# Patient Record
Sex: FEMALE | Race: WHITE | NOT HISPANIC OR LATINO | URBAN - METROPOLITAN AREA
[De-identification: names, ages, dates, MRNs, and addresses within clinical notes are randomized per-mention and may not be internally consistent; named-entity substitution may affect disease eponyms.]

---

## 2017-01-21 ENCOUNTER — OUTPATIENT (OUTPATIENT)
Dept: OUTPATIENT SERVICES | Facility: HOSPITAL | Age: 57
LOS: 1 days | End: 2017-01-21
Payer: MEDICARE

## 2017-01-21 ENCOUNTER — APPOINTMENT (OUTPATIENT)
Dept: NUCLEAR MEDICINE | Facility: IMAGING CENTER | Age: 57
End: 2017-01-21

## 2017-01-21 DIAGNOSIS — Z00.8 ENCOUNTER FOR OTHER GENERAL EXAMINATION: ICD-10-CM

## 2017-01-21 PROCEDURE — 78072 PARATHYRD PLANAR W/SPECT&CT: CPT

## 2017-01-21 PROCEDURE — A9500: CPT

## 2017-01-21 PROCEDURE — A9512: CPT

## 2017-04-26 ENCOUNTER — APPOINTMENT (OUTPATIENT)
Dept: CARDIOLOGY | Facility: CLINIC | Age: 57
End: 2017-04-26

## 2017-05-03 ENCOUNTER — INPATIENT (INPATIENT)
Facility: HOSPITAL | Age: 57
LOS: 0 days | Discharge: HOME | End: 2017-05-04
Attending: HOSPITALIST | Admitting: INTERNAL MEDICINE
Payer: MEDICARE

## 2017-05-03 VITALS
RESPIRATION RATE: 18 BRPM | DIASTOLIC BLOOD PRESSURE: 127 MMHG | SYSTOLIC BLOOD PRESSURE: 144 MMHG | OXYGEN SATURATION: 97 % | HEART RATE: 174 BPM | TEMPERATURE: 98 F

## 2017-05-03 DIAGNOSIS — I47.1 SUPRAVENTRICULAR TACHYCARDIA: ICD-10-CM

## 2017-05-03 LAB
ALBUMIN SERPL ELPH-MCNC: 4.6 G/DL — SIGNIFICANT CHANGE UP (ref 3.3–5)
ALP SERPL-CCNC: 79 U/L — SIGNIFICANT CHANGE UP (ref 40–120)
ALT FLD-CCNC: 58 U/L — SIGNIFICANT CHANGE UP (ref 12–78)
ANION GAP SERPL CALC-SCNC: 8 MMOL/L — SIGNIFICANT CHANGE UP (ref 5–17)
ANION GAP SERPL CALC-SCNC: 8 MMOL/L — SIGNIFICANT CHANGE UP (ref 5–17)
APTT BLD: 33.1 SEC — SIGNIFICANT CHANGE UP (ref 27.5–37.4)
AST SERPL-CCNC: 30 U/L — SIGNIFICANT CHANGE UP (ref 15–37)
BASOPHILS # BLD AUTO: 0.1 K/UL — SIGNIFICANT CHANGE UP (ref 0–0.2)
BASOPHILS # BLD AUTO: 0.1 K/UL — SIGNIFICANT CHANGE UP (ref 0–0.2)
BASOPHILS NFR BLD AUTO: 0.9 % — SIGNIFICANT CHANGE UP (ref 0–2)
BASOPHILS NFR BLD AUTO: 1 % — SIGNIFICANT CHANGE UP (ref 0–2)
BILIRUB SERPL-MCNC: 0.4 MG/DL — SIGNIFICANT CHANGE UP (ref 0.2–1.2)
BLD GP AB SCN SERPL QL: SIGNIFICANT CHANGE UP
BUN SERPL-MCNC: 10 MG/DL — SIGNIFICANT CHANGE UP (ref 7–23)
BUN SERPL-MCNC: 9 MG/DL — SIGNIFICANT CHANGE UP (ref 7–23)
CALCIUM SERPL-MCNC: 8.8 MG/DL — SIGNIFICANT CHANGE UP (ref 8.5–10.1)
CALCIUM SERPL-MCNC: 9.5 MG/DL — SIGNIFICANT CHANGE UP (ref 8.5–10.1)
CHLORIDE SERPL-SCNC: 107 MMOL/L — SIGNIFICANT CHANGE UP (ref 96–108)
CHLORIDE SERPL-SCNC: 109 MMOL/L — HIGH (ref 96–108)
CK SERPL-CCNC: 207 U/L — HIGH (ref 26–192)
CO2 SERPL-SCNC: 24 MMOL/L — SIGNIFICANT CHANGE UP (ref 22–31)
CO2 SERPL-SCNC: 27 MMOL/L — SIGNIFICANT CHANGE UP (ref 22–31)
CREAT SERPL-MCNC: 0.68 MG/DL — SIGNIFICANT CHANGE UP (ref 0.5–1.3)
CREAT SERPL-MCNC: 0.8 MG/DL — SIGNIFICANT CHANGE UP (ref 0.5–1.3)
EOSINOPHIL # BLD AUTO: 0.3 K/UL — SIGNIFICANT CHANGE UP (ref 0–0.5)
EOSINOPHIL # BLD AUTO: 0.5 K/UL — SIGNIFICANT CHANGE UP (ref 0–0.5)
EOSINOPHIL NFR BLD AUTO: 3.7 % — SIGNIFICANT CHANGE UP (ref 0–6)
EOSINOPHIL NFR BLD AUTO: 4.5 % — SIGNIFICANT CHANGE UP (ref 0–6)
GLUCOSE SERPL-MCNC: 114 MG/DL — HIGH (ref 70–99)
GLUCOSE SERPL-MCNC: 200 MG/DL — HIGH (ref 70–99)
HCT VFR BLD CALC: 37 % — SIGNIFICANT CHANGE UP (ref 34.5–45)
HCT VFR BLD CALC: 44.5 % — SIGNIFICANT CHANGE UP (ref 34.5–45)
HGB BLD-MCNC: 12.9 G/DL — SIGNIFICANT CHANGE UP (ref 11.5–15.5)
HGB BLD-MCNC: 15.4 G/DL — SIGNIFICANT CHANGE UP (ref 11.5–15.5)
INR BLD: 0.97 RATIO — SIGNIFICANT CHANGE UP (ref 0.88–1.16)
LYMPHOCYTES # BLD AUTO: 2.2 K/UL — SIGNIFICANT CHANGE UP (ref 1–3.3)
LYMPHOCYTES # BLD AUTO: 29 % — SIGNIFICANT CHANGE UP (ref 13–44)
LYMPHOCYTES # BLD AUTO: 38.8 % — SIGNIFICANT CHANGE UP (ref 13–44)
LYMPHOCYTES # BLD AUTO: 4.1 K/UL — HIGH (ref 1–3.3)
MCHC RBC-ENTMCNC: 31.7 PG — SIGNIFICANT CHANGE UP (ref 27–34)
MCHC RBC-ENTMCNC: 31.8 PG — SIGNIFICANT CHANGE UP (ref 27–34)
MCHC RBC-ENTMCNC: 34.5 GM/DL — SIGNIFICANT CHANGE UP (ref 32–36)
MCHC RBC-ENTMCNC: 34.7 GM/DL — SIGNIFICANT CHANGE UP (ref 32–36)
MCV RBC AUTO: 91.5 FL — SIGNIFICANT CHANGE UP (ref 80–100)
MCV RBC AUTO: 92 FL — SIGNIFICANT CHANGE UP (ref 80–100)
MONOCYTES # BLD AUTO: 0.5 K/UL — SIGNIFICANT CHANGE UP (ref 0–0.9)
MONOCYTES # BLD AUTO: 0.7 K/UL — SIGNIFICANT CHANGE UP (ref 0–0.9)
MONOCYTES NFR BLD AUTO: 6.8 % — SIGNIFICANT CHANGE UP (ref 2–14)
MONOCYTES NFR BLD AUTO: 6.8 % — SIGNIFICANT CHANGE UP (ref 2–14)
NEUTROPHILS # BLD AUTO: 4.6 K/UL — SIGNIFICANT CHANGE UP (ref 1.8–7.4)
NEUTROPHILS # BLD AUTO: 5.1 K/UL — SIGNIFICANT CHANGE UP (ref 1.8–7.4)
NEUTROPHILS NFR BLD AUTO: 48.9 % — SIGNIFICANT CHANGE UP (ref 43–77)
NEUTROPHILS NFR BLD AUTO: 59.5 % — SIGNIFICANT CHANGE UP (ref 43–77)
PLATELET # BLD AUTO: 254 K/UL — SIGNIFICANT CHANGE UP (ref 150–400)
PLATELET # BLD AUTO: 302 K/UL — SIGNIFICANT CHANGE UP (ref 150–400)
POTASSIUM SERPL-MCNC: 3.7 MMOL/L — SIGNIFICANT CHANGE UP (ref 3.5–5.3)
POTASSIUM SERPL-MCNC: 3.7 MMOL/L — SIGNIFICANT CHANGE UP (ref 3.5–5.3)
POTASSIUM SERPL-SCNC: 3.7 MMOL/L — SIGNIFICANT CHANGE UP (ref 3.5–5.3)
POTASSIUM SERPL-SCNC: 3.7 MMOL/L — SIGNIFICANT CHANGE UP (ref 3.5–5.3)
PROT SERPL-MCNC: 8.3 GM/DL — SIGNIFICANT CHANGE UP (ref 6–8.3)
PROTHROM AB SERPL-ACNC: 10.5 SEC — SIGNIFICANT CHANGE UP (ref 9.8–12.7)
RBC # BLD: 4.05 M/UL — SIGNIFICANT CHANGE UP (ref 3.8–5.2)
RBC # BLD: 4.84 M/UL — SIGNIFICANT CHANGE UP (ref 3.8–5.2)
RBC # FLD: 11.7 % — SIGNIFICANT CHANGE UP (ref 10.3–14.5)
RBC # FLD: 11.8 % — SIGNIFICANT CHANGE UP (ref 10.3–14.5)
SODIUM SERPL-SCNC: 141 MMOL/L — SIGNIFICANT CHANGE UP (ref 135–145)
SODIUM SERPL-SCNC: 142 MMOL/L — SIGNIFICANT CHANGE UP (ref 135–145)
T3 SERPL-MCNC: 79 NG/DL — LOW (ref 80–200)
T4 FREE SERPL-MCNC: 0.87 NG/DL — SIGNIFICANT CHANGE UP (ref 0.76–1.46)
TROPONIN I SERPL-MCNC: 0.02 NG/ML — SIGNIFICANT CHANGE UP (ref 0.01–0.04)
TROPONIN I SERPL-MCNC: 0.04 NG/ML — SIGNIFICANT CHANGE UP (ref 0.01–0.04)
TROPONIN I SERPL-MCNC: <0.015 NG/ML — SIGNIFICANT CHANGE UP (ref 0.01–0.04)
TSH SERPL-MCNC: 3.27 UU/ML — SIGNIFICANT CHANGE UP (ref 0.36–3.74)
TSH SERPL-MCNC: 9.42 UIU/ML — HIGH (ref 0.36–3.74)
TYPE + AB SCN PNL BLD: SIGNIFICANT CHANGE UP
WBC # BLD: 10.5 K/UL — SIGNIFICANT CHANGE UP (ref 3.8–10.5)
WBC # BLD: 7.6 K/UL — SIGNIFICANT CHANGE UP (ref 3.8–10.5)
WBC # FLD AUTO: 10.5 K/UL — SIGNIFICANT CHANGE UP (ref 3.8–10.5)
WBC # FLD AUTO: 7.6 K/UL — SIGNIFICANT CHANGE UP (ref 3.8–10.5)

## 2017-05-03 PROCEDURE — 99223 1ST HOSP IP/OBS HIGH 75: CPT

## 2017-05-03 PROCEDURE — 93010 ELECTROCARDIOGRAM REPORT: CPT | Mod: 76

## 2017-05-03 PROCEDURE — 71010: CPT | Mod: 26

## 2017-05-03 PROCEDURE — 99291 CRITICAL CARE FIRST HOUR: CPT

## 2017-05-03 RX ORDER — PANTOPRAZOLE SODIUM 20 MG/1
40 TABLET, DELAYED RELEASE ORAL
Qty: 0 | Refills: 0 | Status: DISCONTINUED | OUTPATIENT
Start: 2017-05-03 | End: 2017-05-04

## 2017-05-03 RX ORDER — ADENOSINE 3 MG/ML
6 INJECTION INTRAVENOUS ONCE
Qty: 0 | Refills: 0 | Status: COMPLETED | OUTPATIENT
Start: 2017-05-03 | End: 2017-05-03

## 2017-05-03 RX ORDER — LEVOTHYROXINE SODIUM 125 MCG
1 TABLET ORAL
Qty: 0 | Refills: 0 | COMMUNITY

## 2017-05-03 RX ORDER — ASPIRIN/CALCIUM CARB/MAGNESIUM 324 MG
325 TABLET ORAL ONCE
Qty: 0 | Refills: 0 | Status: COMPLETED | OUTPATIENT
Start: 2017-05-03 | End: 2017-05-03

## 2017-05-03 RX ORDER — ALPRAZOLAM 0.25 MG
0.5 TABLET ORAL
Qty: 0 | Refills: 0 | Status: DISCONTINUED | OUTPATIENT
Start: 2017-05-03 | End: 2017-05-04

## 2017-05-03 RX ORDER — METOPROLOL TARTRATE 50 MG
25 TABLET ORAL
Qty: 0 | Refills: 0 | Status: DISCONTINUED | OUTPATIENT
Start: 2017-05-03 | End: 2017-05-04

## 2017-05-03 RX ORDER — ENOXAPARIN SODIUM 100 MG/ML
40 INJECTION SUBCUTANEOUS EVERY 24 HOURS
Qty: 0 | Refills: 0 | Status: DISCONTINUED | OUTPATIENT
Start: 2017-05-03 | End: 2017-05-04

## 2017-05-03 RX ORDER — ATORVASTATIN CALCIUM 80 MG/1
20 TABLET, FILM COATED ORAL AT BEDTIME
Qty: 0 | Refills: 0 | Status: DISCONTINUED | OUTPATIENT
Start: 2017-05-03 | End: 2017-05-04

## 2017-05-03 RX ORDER — LATANOPROST 0.05 MG/ML
1 SOLUTION/ DROPS OPHTHALMIC; TOPICAL AT BEDTIME
Qty: 0 | Refills: 0 | Status: DISCONTINUED | OUTPATIENT
Start: 2017-05-03 | End: 2017-05-04

## 2017-05-03 RX ORDER — LEVOTHYROXINE SODIUM 125 MCG
100 TABLET ORAL DAILY
Qty: 0 | Refills: 0 | Status: DISCONTINUED | OUTPATIENT
Start: 2017-05-03 | End: 2017-05-04

## 2017-05-03 RX ORDER — OMEGA-3 ACID ETHYL ESTERS 1 G
2 CAPSULE ORAL
Qty: 0 | Refills: 0 | Status: DISCONTINUED | OUTPATIENT
Start: 2017-05-03 | End: 2017-05-04

## 2017-05-03 RX ORDER — SODIUM CHLORIDE 9 MG/ML
1000 INJECTION INTRAMUSCULAR; INTRAVENOUS; SUBCUTANEOUS ONCE
Qty: 0 | Refills: 0 | Status: COMPLETED | OUTPATIENT
Start: 2017-05-03 | End: 2017-05-03

## 2017-05-03 RX ADMIN — Medication 1 TABLET(S): at 14:25

## 2017-05-03 RX ADMIN — Medication 0.5 MILLIGRAM(S): at 22:09

## 2017-05-03 RX ADMIN — Medication 100 MICROGRAM(S): at 15:54

## 2017-05-03 RX ADMIN — ATORVASTATIN CALCIUM 20 MILLIGRAM(S): 80 TABLET, FILM COATED ORAL at 22:09

## 2017-05-03 RX ADMIN — Medication 325 MILLIGRAM(S): at 05:50

## 2017-05-03 RX ADMIN — PANTOPRAZOLE SODIUM 40 MILLIGRAM(S): 20 TABLET, DELAYED RELEASE ORAL at 15:54

## 2017-05-03 RX ADMIN — Medication 25 MILLIGRAM(S): at 11:08

## 2017-05-03 RX ADMIN — ENOXAPARIN SODIUM 40 MILLIGRAM(S): 100 INJECTION SUBCUTANEOUS at 14:25

## 2017-05-03 RX ADMIN — Medication 25 MILLIGRAM(S): at 18:17

## 2017-05-03 RX ADMIN — ADENOSINE 6 MILLIGRAM(S): 3 INJECTION INTRAVENOUS at 04:45

## 2017-05-03 RX ADMIN — Medication 2 GRAM(S): at 18:44

## 2017-05-03 RX ADMIN — SODIUM CHLORIDE 1000 MILLILITER(S): 9 INJECTION INTRAMUSCULAR; INTRAVENOUS; SUBCUTANEOUS at 04:46

## 2017-05-03 RX ADMIN — LATANOPROST 1 DROP(S): 0.05 SOLUTION/ DROPS OPHTHALMIC; TOPICAL at 22:09

## 2017-05-03 NOTE — H&P ADULT - NSHPPHYSICALEXAM_GEN_ALL_CORE
Vital Signs Last 24 Hrs  T(C): 36.6, Max: 37.1 (05-03 @ 07:20)  T(F): 97.8, Max: 98.7 (05-03 @ 07:20)  HR: 84 (84 - 174)  BP: 122/87 (103/73 - 144/127)  BP(mean): 83 (83 - 83)  RR: 16 (12 - 18)  SpO2: 99% (97% - 100%)

## 2017-05-03 NOTE — CONSULT NOTE ADULT - ASSESSMENT
55 yo F with SVT    *SVT  -admit to tele  -cardiac monitoring  -cardiology cx dr. patel  -daily ekg  -monitor lytes and supplement prn  -metoprolol      *Depression/Anxiety  -mood stable, c/w home meds      *Hypothyroidism:  -c/w synthroid  -TSH noted  -check T3, Free T4  -recent thyroid US, normal (no changes) as per pt      *DVT px:  -LMWH

## 2017-05-03 NOTE — CONSULT NOTE ADULT - SUBJECTIVE AND OBJECTIVE BOX
PCP:    REQUESTING PHYSICIAN:    REASON FOR CONSULT:    CHIEF COMPLAINT:    HPI:  57 yo F with PMHx of Hypothyroidism, Anxiety, GERD p/w palpitations. pt woke up at 3 am with the need to void and felt like her heart was racing. checked her pulse at home, ~160-170 bpm. Pt waited about 1 hr and no improvement, came to ED for eval. Pt given adenosine in ED and converted to NSR. Currently comfortable in ED, does report intermittent palpitations. Pt saw her Endocrinologist yesterday and had an US of her thyroid, no changes from prior. her synthroid dose was also increased, pt did not yet start higher dose. Denies any tremor, weight change, visual changes, hair loss. She does report feeling hot often. (03 May 2017 10:43).  She has seen Dr. Jiménez in the past for an abnormal ECG which has remain unchanged for several years. She had a stress test several years ago which was non ischemic. She denies any exertional symptoms. She has never had an episode like this before.       PAST MEDICAL & SURGICAL HISTORY:  Hypothyroidism  Depression  Anxiety  GERD (gastroesophageal reflux disease)  HLD (hyperlipidemia)  No significant past surgical history      Allergies    codeine (Other)    Intolerances        SOCIAL HISTORY: ETOH 5/week, no tobacco    FAMILY HISTORY:  No pertinent family history in first degree relatives      MEDICATIONS:  MEDICATIONS  (STANDING):  atorvastatin 20milliGRAM(s) Oral at bedtime  omega-3-Acid Ethyl Esters 2Gram(s) Oral two times a day  latanoprost 0.005% Ophthalmic Solution 1Drop(s) Both EYES at bedtime  pantoprazole    Tablet 40milliGRAM(s) Oral before breakfast  levothyroxine 100MICROGram(s) Oral daily  multivitamin 1Tablet(s) Oral daily  enoxaparin Injectable 40milliGRAM(s) SubCutaneous every 24 hours  metoprolol 25milliGRAM(s) Oral two times a day    MEDICATIONS  (PRN):  ALPRAZolam 0.5milliGRAM(s) Oral two times a day PRN anxiety      REVIEW OF SYSTEMS:    CONSTITUTIONAL: No weakness, fevers or chills  EYES/ENT: No visual changes;  No vertigo or throat pain   NECK: No pain or stiffness  RESPIRATORY: No cough, wheezing, hemoptysis; No shortness of breath  CARDIOVASCULAR: No chest pain or palpitations  GASTROINTESTINAL: No abdominal or epigastric pain. No nausea, vomiting, or hematemesis; No diarrhea or constipation. No melena or hematochezia.  GENITOURINARY: No dysuria, frequency or hematuria  NEUROLOGICAL: No numbness or weakness  SKIN: No itching, burning, rashes, or lesions   All other review of systems is negative unless indicated above    Vital Signs Last 24 Hrs  T(C): 37.2, Max: 37.2 (05-03 @ 16:04)  T(F): 98.9, Max: 98.9 (05-03 @ 16:04)  HR: 67 (67 - 174)  BP: 129/77 (103/73 - 149/87)  BP(mean): 83 (83 - 83)  RR: 17 (12 - 18)  SpO2: 96% (96% - 100%)    I&O's Summary    I & Os for current day (as of 03 May 2017 16:32)  =============================================  IN: 1000 ml / OUT: 0 ml / NET: 1000 ml      PHYSICAL EXAM:    Constitutional: NAD, awake and alert, well-developed  HEENT: PERR, EOMI,  No oral cyananosis.  Neck:  supple,  No JVD  Respiratory: Breath sounds are clear bilaterally, No wheezing, rales or rhonchi  Cardiovascular: S1 and S2, regular rate and rhythm, no Murmurs, gallops or rubs  Gastrointestinal: Bowel Sounds present, soft, nontender.   Extremities: No peripheral edema. No clubbing or cyanosis.  Vascular: 2+ peripheral pulses  Neurological: A/O x 3, no focal deficits  Musculoskeletal: no calf tenderness.  Skin: No rashes.      LABS: All Labs Reviewed:                        12.9   7.6   )-----------( 254      ( 03 May 2017 12:01 )             37.0                         15.4   10.5  )-----------( 302      ( 03 May 2017 04:34 )             44.5     03 May 2017 12:01    141    |  109    |  9      ----------------------------<  114    3.7     |  24     |  0.68   03 May 2017 04:34    142    |  107    |  10     ----------------------------<  200    3.7     |  27     |  0.80     Ca    8.8        03 May 2017 12:01  Ca    9.5        03 May 2017 04:34    TPro  8.3    /  Alb  4.6    /  TBili  0.4    /  DBili  x      /  AST  30     /  ALT  58     /  AlkPhos  79     03 May 2017 04:34    PT/INR - ( 03 May 2017 04:34 )   PT: 10.5 sec;   INR: 0.97 ratio         PTT - ( 03 May 2017 04:34 )  PTT:33.1 sec  CARDIAC MARKERS ( 03 May 2017 12:01 )  0.036 ng/mL / x     / x     / x     / x      CARDIAC MARKERS ( 03 May 2017 04:34 )  <0.015 ng/mL / x     / 207 U/L / x     / x          Blood Culture:     05-03 @ 12:01  TSH: 3.270  05-03 @ 04:34  TSH: 9.420      RADIOLOGY/EKG:  PCP:    REQUESTING PHYSICIAN:    REASON FOR CONSULT:    CHIEF COMPLAINT:    HPI:  57 yo F with PMHx of Hypothyroidism, Anxiety, GERD p/w palpitations. pt woke up at 3 am with the need to void and felt like her heart was racing. checked her pulse at home, ~160-170 bpm. Pt waited about 1 hr and no improvement, came to ED for eval. Pt given adenosine in ED and converted to NSR. Currently comfortable in ED, does report intermittent palpitations. Pt saw her Endocrinologist yesterday and had an US of her thyroid, no changes from prior. her synthroid dose was also increased, pt did not yet start higher dose. Denies any tremor, weight change, visual changes, hair loss. She does report feeling hot often. (03 May 2017 10:43)      PAST MEDICAL & SURGICAL HISTORY:  Hypothyroidism  Depression  Anxiety  GERD (gastroesophageal reflux disease)  HLD (hyperlipidemia)  No significant past surgical history      Allergies    codeine (Other)    Intolerances        SOCIAL HISTORY:    FAMILY HISTORY:  No pertinent family history in first degree relatives      MEDICATIONS:  MEDICATIONS  (STANDING):  atorvastatin 20milliGRAM(s) Oral at bedtime  omega-3-Acid Ethyl Esters 2Gram(s) Oral two times a day  latanoprost 0.005% Ophthalmic Solution 1Drop(s) Both EYES at bedtime  pantoprazole    Tablet 40milliGRAM(s) Oral before breakfast  levothyroxine 100MICROGram(s) Oral daily  multivitamin 1Tablet(s) Oral daily  enoxaparin Injectable 40milliGRAM(s) SubCutaneous every 24 hours  metoprolol 25milliGRAM(s) Oral two times a day    MEDICATIONS  (PRN):  ALPRAZolam 0.5milliGRAM(s) Oral two times a day PRN anxiety      REVIEW OF SYSTEMS:    CONSTITUTIONAL: No weakness, fevers or chills  EYES/ENT: No visual changes;  No vertigo or throat pain   NECK: No pain or stiffness  RESPIRATORY: No cough, wheezing, hemoptysis; No shortness of breath  CARDIOVASCULAR: No chest pain or palpitations  GASTROINTESTINAL: No abdominal or epigastric pain. No nausea, vomiting, or hematemesis; No diarrhea or constipation. No melena or hematochezia.  GENITOURINARY: No dysuria, frequency or hematuria  NEUROLOGICAL: No numbness or weakness  SKIN: No itching, burning, rashes, or lesions   All other review of systems is negative unless indicated above    Vital Signs Last 24 Hrs  T(C): 37.2, Max: 37.2 (05-03 @ 16:04)  T(F): 98.9, Max: 98.9 (05-03 @ 16:04)  HR: 67 (67 - 174)  BP: 129/77 (103/73 - 149/87)  BP(mean): 83 (83 - 83)  RR: 17 (12 - 18)  SpO2: 96% (96% - 100%)    I&O's Summary    I & Os for current day (as of 03 May 2017 16:32)  =============================================  IN: 1000 ml / OUT: 0 ml / NET: 1000 ml      PHYSICAL EXAM:    Constitutional: NAD, awake and alert, well-developed  HEENT: PERR, EOMI,  No oral cyananosis.  Neck:  supple,  No JVD  Respiratory: Breath sounds are clear bilaterally, No wheezing, rales or rhonchi  Cardiovascular: S1 and S2, regular rate and rhythm, no Murmurs, gallops or rubs  Gastrointestinal: Bowel Sounds present, soft, nontender.   Extremities: No peripheral edema. No clubbing or cyanosis.  Vascular: 2+ peripheral pulses  Neurological: A/O x 3, no focal deficits  Musculoskeletal: no calf tenderness.  Skin: No rashes.      LABS: All Labs Reviewed:                        12.9   7.6   )-----------( 254      ( 03 May 2017 12:01 )             37.0                         15.4   10.5  )-----------( 302      ( 03 May 2017 04:34 )             44.5     03 May 2017 12:01    141    |  109    |  9      ----------------------------<  114    3.7     |  24     |  0.68   03 May 2017 04:34    142    |  107    |  10     ----------------------------<  200    3.7     |  27     |  0.80     Ca    8.8        03 May 2017 12:01  Ca    9.5        03 May 2017 04:34    TPro  8.3    /  Alb  4.6    /  TBili  0.4    /  DBili  x      /  AST  30     /  ALT  58     /  AlkPhos  79     03 May 2017 04:34    PT/INR - ( 03 May 2017 04:34 )   PT: 10.5 sec;   INR: 0.97 ratio         PTT - ( 03 May 2017 04:34 )  PTT:33.1 sec  CARDIAC MARKERS ( 03 May 2017 12:01 )  0.036 ng/mL / x     / x     / x     / x      CARDIAC MARKERS ( 03 May 2017 04:34 )  <0.015 ng/mL / x     / 207 U/L / x     / x          Blood Culture:     05-03 @ 12:01  TSH: 3.270  05-03 @ 04:34  TSH: 9.420      RADIOLOGY/EKG:    NSR anterior T wave inversions (described previously).

## 2017-05-03 NOTE — CONSULT NOTE ADULT - PROBLEM SELECTOR RECOMMENDATION 9
Pt has been in NSR since admission. She remains hemodynamically stable. Pt should have thyroid status evaluated and treated. Outpatient cardiac workup. Pt has been in NSR since admission. She remains hemodynamically stable. Pt should have thyroid status evaluated and treated. Outpatient cardiac workup. Continue beta blocker.

## 2017-05-03 NOTE — ED ADULT NURSE REASSESSMENT NOTE - NS ED NURSE REASSESS COMMENT FT1
Adenosine 6mg IVP administered by  with Rapid conversion of SVT to Sinus tachycardia. Pt tolerated well. HR at 100s. No complains noted at this time. Will continue to monitor closely.

## 2017-05-03 NOTE — ED PROVIDER NOTE - DETAILS:
I, Marylu Sandoval, performed the initial face to face bedside interview with this patient regarding history of present illness, review of symptoms and relevant past medical, social and family history.  I completed an independent physical examination.  I was the initial provider who evaluated this patient. The history, relevant review of systems, past medical and surgical history, medical decision making, and physical examination was documented by the scribe in my presence and I attest to the accuracy of the documentation.

## 2017-05-03 NOTE — H&P ADULT - ASSESSMENT
57 yo F with SVT    *SVT  -admit to tele  -cardiac monitoring  -cardiology cx dr. patel  -daily ekg  -monitor lytes and supplement prn  -metoprolol      *Depression/Anxiety  -mood stable, c/w home meds      *Hypothyroidism:  -c/w synthroid  -TSH noted  -check T3, Free T4  -recent thyroid US, normal (no changes) as per pt      *DVT px:  -LMWH

## 2017-05-03 NOTE — H&P ADULT - PMH
Anxiety    Depression    GERD (gastroesophageal reflux disease)    HLD (hyperlipidemia)    Hypothyroidism

## 2017-05-03 NOTE — ED ADULT NURSE NOTE - ED STAT RN HANDOFF DETAILS
Recvd care of pt at this time from [facility]AN[facility] June, pt resting comfortably, A & O x 4, with no complaints at this time.

## 2017-05-03 NOTE — H&P ADULT - NSHPLABSRESULTS_GEN_ALL_CORE
CARDIAC MARKERS ( 03 May 2017 04:34 )  <0.015 ng/mL / x     / 207 U/L / x     / x                                15.4   10.5  )-----------( 302      ( 03 May 2017 04:34 )             44.5     03 May 2017 04:34    142    |  107    |  10     ----------------------------<  200    3.7     |  27     |  0.80     Ca    9.5        03 May 2017 04:34    TPro  8.3    /  Alb  4.6    /  TBili  0.4    /  DBili  x      /  AST  30     /  ALT  58     /  AlkPhos  79     03 May 2017 04:34    PT/INR - ( 03 May 2017 04:34 )   PT: 10.5 sec;   INR: 0.97 ratio         PTT - ( 03 May 2017 04:34 )  PTT:33.1 sec  CAPILLARY BLOOD GLUCOSE    LIVER FUNCTIONS - ( 03 May 2017 04:34 )  Alb: 4.6 g/dL / Pro: 8.3 gm/dL / ALK PHOS: 79 U/L / ALT: 58 U/L / AST: 30 U/L / GGT: x

## 2017-05-03 NOTE — H&P ADULT - HISTORY OF PRESENT ILLNESS
57 yo F with PMHx of Hypothyroidism, Anxiety, GERD p/w palpitations. pt woke up at 3 am with the need to void and felt like her heart was racing. checked her pulse at home, ~160-170 bpm. Pt waited about 1 hr and no improvement, came to ED for eval. Pt given adenosine in ED and converted to NSR. Currently comfortable in ED, does report intermittent palpitations. Pt saw her Endocrinologist yesterday and had an US of her thyroid, no changes from prior. her synthroid dose was also increased, pt did not yet start higher dose. Denies any tremor, weight change, visual changes, hair loss. She does report feeling hot often.

## 2017-05-03 NOTE — ED PROVIDER NOTE - NS ED MD SCRIBE ATTENDING SCRIBE SECTIONS
REVIEW OF SYSTEMS/DISPOSITION/VITAL SIGNS( Pullset)/RESULTS/PAST MEDICAL/SURGICAL/SOCIAL HISTORY/PROGRESS NOTE/HISTORY OF PRESENT ILLNESS/PHYSICAL EXAM

## 2017-05-03 NOTE — ED ADULT NURSE NOTE - OBJECTIVE STATEMENT
Pt presented to ED c/o palpitation. As per pt, pt woke up this morning and felt her "heart racing" in conjunction with urinary urgency. No chest pain as per pt. Upon presentation to ED, pt in SVT with HR of 170s. No SOB, fever, abd pain, N/V/D. No other complains noted at this time.

## 2017-05-04 ENCOUNTER — TRANSCRIPTION ENCOUNTER (OUTPATIENT)
Age: 57
End: 2017-05-04

## 2017-05-04 VITALS
SYSTOLIC BLOOD PRESSURE: 134 MMHG | HEART RATE: 65 BPM | TEMPERATURE: 98 F | DIASTOLIC BLOOD PRESSURE: 87 MMHG | OXYGEN SATURATION: 97 % | RESPIRATION RATE: 17 BRPM

## 2017-05-04 DIAGNOSIS — R00.2 PALPITATIONS: ICD-10-CM

## 2017-05-04 LAB
ANION GAP SERPL CALC-SCNC: 7 MMOL/L — SIGNIFICANT CHANGE UP (ref 5–17)
BUN SERPL-MCNC: 15 MG/DL — SIGNIFICANT CHANGE UP (ref 7–23)
CALCIUM SERPL-MCNC: 9.4 MG/DL — SIGNIFICANT CHANGE UP (ref 8.5–10.1)
CHLORIDE SERPL-SCNC: 107 MMOL/L — SIGNIFICANT CHANGE UP (ref 96–108)
CO2 SERPL-SCNC: 28 MMOL/L — SIGNIFICANT CHANGE UP (ref 22–31)
CREAT SERPL-MCNC: 0.75 MG/DL — SIGNIFICANT CHANGE UP (ref 0.5–1.3)
GLUCOSE SERPL-MCNC: 129 MG/DL — HIGH (ref 70–99)
MAGNESIUM SERPL-MCNC: 2.2 MG/DL — SIGNIFICANT CHANGE UP (ref 1.8–2.4)
POTASSIUM SERPL-MCNC: 3.7 MMOL/L — SIGNIFICANT CHANGE UP (ref 3.5–5.3)
POTASSIUM SERPL-SCNC: 3.7 MMOL/L — SIGNIFICANT CHANGE UP (ref 3.5–5.3)
SODIUM SERPL-SCNC: 142 MMOL/L — SIGNIFICANT CHANGE UP (ref 135–145)

## 2017-05-04 PROCEDURE — 99232 SBSQ HOSP IP/OBS MODERATE 35: CPT

## 2017-05-04 RX ORDER — METOPROLOL TARTRATE 50 MG
50 TABLET ORAL
Qty: 0 | Refills: 0 | Status: DISCONTINUED | OUTPATIENT
Start: 2017-05-04 | End: 2017-05-04

## 2017-05-04 RX ORDER — METOPROLOL TARTRATE 50 MG
1 TABLET ORAL
Qty: 0 | Refills: 0 | COMMUNITY
Start: 2017-05-04

## 2017-05-04 RX ORDER — METOPROLOL TARTRATE 50 MG
1 TABLET ORAL
Qty: 60 | Refills: 0
Start: 2017-05-04 | End: 2017-06-03

## 2017-05-04 RX ADMIN — PANTOPRAZOLE SODIUM 40 MILLIGRAM(S): 20 TABLET, DELAYED RELEASE ORAL at 06:02

## 2017-05-04 RX ADMIN — Medication 100 MICROGRAM(S): at 06:02

## 2017-05-04 RX ADMIN — Medication 25 MILLIGRAM(S): at 06:02

## 2017-05-04 RX ADMIN — Medication 1 TABLET(S): at 10:44

## 2017-05-04 RX ADMIN — Medication 2 GRAM(S): at 06:02

## 2017-05-04 RX ADMIN — ENOXAPARIN SODIUM 40 MILLIGRAM(S): 100 INJECTION SUBCUTANEOUS at 10:44

## 2017-05-04 NOTE — DISCHARGE NOTE ADULT - CARE PROVIDER_API CALL
Dwayne Sales), Family Medicine; Geriatric Medicine  [address] [place] [address]  Phone: [phone]  Fax: [phone]

## 2017-05-04 NOTE — PROGRESS NOTE ADULT - SUBJECTIVE AND OBJECTIVE BOX
PMD: Dr. Sales    HPI:   History of Present Illness: 	  57 yo F with PMHx of Hypothyroidism, Anxiety, GERD p/w palpitations. pt woke up at 3 am with the need to void and felt like her heart was racing. checked her pulse at home, ~160-170 bpm. Pt waited about 1 hr and no improvement, came to ED for eval. Pt given adenosine in ED and converted to NSR. Currently comfortable in ED, does report intermittent palpitations. Pt saw her Endocrinologist yesterday and had an US of her thyroid, no changes from prior. her synthroid dose was also increased, pt did not yet start higher dose. Denies any tremor, weight change, visual changes, hair loss. She does report feeling hot often.    5/3- c/o palpitations late last evening and was told her HR was 126 on monitor.  No events since.  Ambulating in hallway w/o complaints. Tele NSR 80s. She initially wanted to stay overnight b/c she was worried about having sx at home and having to return.  But she spoke w/ Cardio and now is eager to leave and follow up with her Cardiologist.     ROS- 10pt ros neg.     Vital Signs Last 24 Hrs  T(C): 36.8, Max: 37.2 (05-03 @ 16:04)  T(F): 98.3, Max: 98.9 (05-03 @ 16:04)  HR: 65 (65 - 85)  BP: 134/87 (124/73 - 155/87)  BP(mean): --  RR: 17 (17 - 18)  SpO2: 97% (96% - 97%)      PHYSICAL EXAM:  General: NAD, comfortable  Neuro: AAOx3, no focal deficits  HEENT: NCAT  Neck: Soft and supple, No JVD  Respiratory: CTA b/l, no w/r/r  Cardiovascular: S1 and S2, RRR, no m/g/r  Gastrointestinal: +BS, soft, NTND, no rebound/guarding  Extremities: No c/c/e  Vascular: 2+ peripheral pulses  Musculoskeletal: 5/5 strength b/l UE and LE, sensation intact  Skin: No rashes        LABS: All Labs Reviewed:                        12.9   7.6   )-----------( 254      ( 03 May 2017 12:01 )             37.0     05-04    142  |  107  |  15  ----------------------------<  129<H>  3.7   |  28  |  0.75    Ca    9.4      04 May 2017 05:49  Mg     2.2     05-04    TPro  8.3  /  Alb  4.6  /  TBili  0.4  /  DBili  x   /  AST  30  /  ALT  58  /  AlkPhos  79  05-03    PT/INR - ( 03 May 2017 04:34 )   PT: 10.5 sec;   INR: 0.97 ratio         PTT - ( 03 May 2017 04:34 )  PTT:33.1 sec  CARDIAC MARKERS ( 03 May 2017 16:45 )  0.020 ng/mL / x     / x     / x     / x      CARDIAC MARKERS ( 03 May 2017 12:01 )  0.036 ng/mL / x     / x     / x     / x      CARDIAC MARKERS ( 03 May 2017 04:34 )  <0.015 ng/mL / x     / 207 U/L / x     / x          Thyroid Stimulating Hormone, Serum (05.03.17 @ 12:01)    Thyroid Stimulating Hormone, Serum: 3.270 uU/mL    Free Thyroxine, Serum in AM (05.03.17 @ 12:01)    Free Thyroxine, Serum: 0.87 ng/dL        EXAM:  CHEST SINGLE VIEW FRONTAL          ATE:  05/03/2017  IMPRESSION: No evidence of activechest disease.           MEDICATIONS  (STANDING):  atorvastatin 20milliGRAM(s) Oral at bedtime  omega-3-Acid Ethyl Esters 2Gram(s) Oral two times a day  latanoprost 0.005% Ophthalmic Solution 1Drop(s) Both EYES at bedtime  pantoprazole    Tablet 40milliGRAM(s) Oral before breakfast  levothyroxine 100MICROGram(s) Oral daily  multivitamin 1Tablet(s) Oral daily  enoxaparin Injectable 40milliGRAM(s) SubCutaneous every 24 hours  metoprolol 50milliGRAM(s) Oral two times a day    MEDICATIONS  (PRN):  ALPRAZolam 0.5milliGRAM(s) Oral two times a day PRN anxiety    ASSESSMENT and PLAN  56y female admitted w/    *SVT  - s/p adenosine  - NSR, rate controlled on BB  - Cardio f/u appreciated  - pt will f/u w/ her Cardio outpatient    *depression/anxiety  - home meds    *hypothyroidism  - TSH repeated and is normal  - continue synthroid- f/u w/ Endo outpt    *dvt px    Stable for d/c home. Time to d/c >45min.

## 2017-05-04 NOTE — DISCHARGE NOTE ADULT - MEDICATION SUMMARY - MEDICATIONS TO TAKE
I will START or STAY ON the medications listed below when I get home from the hospital:    desvenlafaxine 100 mg oral tablet, extended release  -- 1 tab(s) by mouth once a day (at bedtime)  -- Indication: For Home med    atorvastatin 20 mg oral tablet  -- 1 tab(s) by mouth once a day (at bedtime)  -- Indication: For Home med    ALPRAZolam 0.5 mg oral tablet  -- 1 tab(s) by mouth 2 times a day, As Needed  -- Indication: For Home med    metoprolol tartrate 25 mg oral tablet  -- 1 tab(s) by mouth 2 times a day  -- It is very important that you take or use this exactly as directed.  Do not skip doses or discontinue unless directed by your doctor.  May cause drowsiness.  Alcohol may intensify this effect.  Use care when operating dangerous machinery.  Some non-prescription drugs may aggravate your condition.  Read all labels carefully.  If a warning appears, check with your doctor before taking.  Take with food or milk.  This drug may impair the ability to drive or operate machinery.  Use care until you become familiar with its effects.    -- Indication: For New med for heart rate control    Melatonin 5 mg oral capsule  -- 1 cap(s) by mouth once a day (at bedtime)  -- Indication: For Home med    Fish Oil oral capsule  -- 1 cap(s) by mouth 2 times a day  -- Indication: For Home med    lutein 6 mg oral capsule  -- 1 cap(s) by mouth once a day  -- Indication: For Home med    latanoprost 0.005% ophthalmic solution  -- 1 drop(s) in each eye once a day (in the evening)  -- Indication: For Home med    pantoprazole 40 mg oral delayed release tablet  -- 1 tab(s) by mouth once a day  -- Indication: For  home med    Synthroid 100 mcg (0.1 mg) oral tablet  -- 1 tab(s) by mouth once a day  -- Indication: For Home med    multivitamin  -- 1 tab(s)  once a day  -- Indication: For Home med

## 2017-05-04 NOTE — DISCHARGE NOTE ADULT - PATIENT PORTAL LINK FT
“You can access the [employer] Patient Portal, offered by [facility], by registering with the following website: [email or web address]”

## 2017-05-04 NOTE — DISCHARGE NOTE ADULT - PLAN OF CARE
no chest pain, palpitations, dizziness You had episode SVT in ED which converted to normal sinus rhythm with adenosine.  Your heart rate has been well controlled on metoprolol. Continue metoprolol as prescribed. Follow up with your Cardiologist and primary physician in few days.

## 2017-05-04 NOTE — PROGRESS NOTE ADULT - SUBJECTIVE AND OBJECTIVE BOX
REASON VISIT: SVT    HPI:  57 yo F with PMHx of Hypothyroidism, Anxiety, GERD admitted with SVT that was terminated with adenosine in the ED.    5/4/17:  Comfortable; no new complaints.    MEDICATIONS  (STANDING):  atorvastatin 20milliGRAM(s) Oral at bedtime  omega-3-Acid Ethyl Esters 2Gram(s) Oral two times a day  latanoprost 0.005% Ophthalmic Solution 1Drop(s) Both EYES at bedtime  pantoprazole    Tablet 40milliGRAM(s) Oral before breakfast  levothyroxine 100MICROGram(s) Oral daily  multivitamin 1Tablet(s) Oral daily  enoxaparin Injectable 40milliGRAM(s) SubCutaneous every 24 hours  metoprolol 50milliGRAM(s) Oral two times a day    Vital Signs Last 24 Hrs  T(C): 36.8, Max: 37.2 (05-03 @ 16:04)  T(F): 98.3, Max: 98.9 (05-03 @ 16:04)  HR: 65 (65 - 85)  BP: 134/87 (124/73 - 155/87)  BP(mean): --  RR: 17 (17 - 18)  SpO2: 97% (96% - 97%)    PHYSICAL EXAM:  Constitutional: Well-appearing  HEENT: Pupils are round  Neck:  supple,  No JVD  Respiratory: Breath sounds are clear bilaterally, No wheezing, rales or rhonchi  Cardiovascular: Normal S1 and S2, regular rate and rhythm  Extremities: No peripheral edema. No clubbing or cyanosis.  Skin: No rashes.    LABS:   CARDIAC MARKERS ( 03 May 2017 16:45 ) 0.020 ng/mL / x     / x     / x     / x      CARDIAC MARKERS ( 03 May 2017 12:01 ) 0.036 ng/mL / x     / x     / x     / x      CARDIAC MARKERS ( 03 May 2017 04:34 ) <0.015 ng/mL / x     / 207 U/L / x     / x                      12.9   7.6   )-----------( 254      ( 03 May 2017 12:01 )             37.0     142  |  107  |  15  ----------------------------<  129<H>  3.7   |  28  |  0.75    TSH: 3.270    ECG: Normal sinus rhythm, nonspecific T wave abnormality    Tele: SR, brief AT

## 2017-05-04 NOTE — DISCHARGE NOTE ADULT - CARE PLAN
Principal Discharge DX:	SVT (supraventricular tachycardia)  Goal:	no chest pain, palpitations, dizziness  Instructions for follow-up, activity and diet:	You had episode SVT in ED which converted to normal sinus rhythm with adenosine.  Your heart rate has been well controlled on metoprolol. Continue metoprolol as prescribed. Follow up with your Cardiologist and primary physician in few days.

## 2017-05-04 NOTE — DISCHARGE NOTE ADULT - HOSPITAL COURSE
ASSESSMENT and PLAN  56y female admitted w/    *SVT  - s/p adenosine  - NSR, rate controlled on BB  - Cardio f/u appreciated  - pt will f/u w/ her Cardio outpatient      *depression/anxiety  - home meds    *hypothyroidism  - TSH repeated and is normal  - continue synthroid- f/u w/ Endo outpt    *dvt px    Stable for d/c home. Time to d/c >45min.

## 2017-05-09 DIAGNOSIS — R00.2 PALPITATIONS: ICD-10-CM

## 2017-05-09 DIAGNOSIS — F32.9 MAJOR DEPRESSIVE DISORDER, SINGLE EPISODE, UNSPECIFIED: ICD-10-CM

## 2017-05-09 DIAGNOSIS — I47.1 SUPRAVENTRICULAR TACHYCARDIA: ICD-10-CM

## 2017-05-09 DIAGNOSIS — F41.9 ANXIETY DISORDER, UNSPECIFIED: ICD-10-CM

## 2017-05-09 DIAGNOSIS — E03.9 HYPOTHYROIDISM, UNSPECIFIED: ICD-10-CM

## 2017-05-09 DIAGNOSIS — E78.5 HYPERLIPIDEMIA, UNSPECIFIED: ICD-10-CM

## 2017-05-09 DIAGNOSIS — K21.9 GASTRO-ESOPHAGEAL REFLUX DISEASE WITHOUT ESOPHAGITIS: ICD-10-CM

## 2017-05-18 ENCOUNTER — APPOINTMENT (OUTPATIENT)
Dept: CARDIOLOGY | Facility: CLINIC | Age: 57
End: 2017-05-18

## 2017-05-18 VITALS
HEIGHT: 63 IN | SYSTOLIC BLOOD PRESSURE: 134 MMHG | OXYGEN SATURATION: 99 % | BODY MASS INDEX: 29.77 KG/M2 | HEART RATE: 63 BPM | DIASTOLIC BLOOD PRESSURE: 86 MMHG | WEIGHT: 168 LBS

## 2017-05-18 RX ORDER — LEVOTHYROXINE SODIUM 100 UG/1
100 TABLET ORAL DAILY
Qty: 90 | Refills: 3 | Status: ACTIVE | COMMUNITY
Start: 2017-05-18

## 2017-05-19 ENCOUNTER — TRANSCRIPTION ENCOUNTER (OUTPATIENT)
Age: 57
End: 2017-05-19

## 2017-06-05 ENCOUNTER — FORM ENCOUNTER (OUTPATIENT)
Age: 57
End: 2017-06-05

## 2017-06-22 ENCOUNTER — MEDICATION RENEWAL (OUTPATIENT)
Age: 57
End: 2017-06-22

## 2017-06-22 RX ORDER — METOPROLOL SUCCINATE 25 MG/1
25 TABLET, EXTENDED RELEASE ORAL
Refills: 0 | Status: DISCONTINUED | COMMUNITY
Start: 2017-05-18 | End: 2017-06-22

## 2017-06-23 ENCOUNTER — MEDICATION RENEWAL (OUTPATIENT)
Age: 57
End: 2017-06-23

## 2017-10-27 ENCOUNTER — OTHER (OUTPATIENT)
Age: 57
End: 2017-10-27

## 2017-12-01 ENCOUNTER — OTHER (OUTPATIENT)
Age: 57
End: 2017-12-01

## 2017-12-04 ENCOUNTER — FORM ENCOUNTER (OUTPATIENT)
Age: 57
End: 2017-12-04

## 2017-12-28 ENCOUNTER — OUTPATIENT (OUTPATIENT)
Dept: OUTPATIENT SERVICES | Facility: HOSPITAL | Age: 57
LOS: 1 days | End: 2017-12-28
Payer: MEDICARE

## 2017-12-28 ENCOUNTER — APPOINTMENT (OUTPATIENT)
Dept: CV DIAGNOSTICS | Facility: HOSPITAL | Age: 57
End: 2017-12-28

## 2017-12-28 DIAGNOSIS — I25.10 ATHEROSCLEROTIC HEART DISEASE OF NATIVE CORONARY ARTERY WITHOUT ANGINA PECTORIS: ICD-10-CM

## 2017-12-28 PROCEDURE — A9500: CPT

## 2017-12-28 PROCEDURE — 78452 HT MUSCLE IMAGE SPECT MULT: CPT

## 2017-12-28 PROCEDURE — 78452 HT MUSCLE IMAGE SPECT MULT: CPT | Mod: 26

## 2017-12-28 PROCEDURE — 93017 CV STRESS TEST TRACING ONLY: CPT

## 2017-12-28 PROCEDURE — 93016 CV STRESS TEST SUPVJ ONLY: CPT

## 2017-12-28 PROCEDURE — 93018 CV STRESS TEST I&R ONLY: CPT

## 2018-01-08 ENCOUNTER — EMERGENCY (EMERGENCY)
Facility: HOSPITAL | Age: 58
LOS: 0 days | Discharge: HOME | End: 2018-01-08
Attending: EMERGENCY MEDICINE | Admitting: EMERGENCY MEDICINE
Payer: MEDICARE

## 2018-01-08 VITALS
OXYGEN SATURATION: 100 % | SYSTOLIC BLOOD PRESSURE: 136 MMHG | HEART RATE: 76 BPM | RESPIRATION RATE: 18 BRPM | TEMPERATURE: 98 F | DIASTOLIC BLOOD PRESSURE: 74 MMHG

## 2018-01-08 VITALS — HEIGHT: 63 IN | WEIGHT: 160.06 LBS

## 2018-01-08 DIAGNOSIS — F41.9 ANXIETY DISORDER, UNSPECIFIED: ICD-10-CM

## 2018-01-08 DIAGNOSIS — E11.9 TYPE 2 DIABETES MELLITUS WITHOUT COMPLICATIONS: ICD-10-CM
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
## 2018-01-08 DIAGNOSIS — S61.219A LACERATION WITHOUT FOREIGN BODY OF UNSPECIFIED FINGER WITHOUT DAMAGE TO NAIL, INITIAL ENCOUNTER: ICD-10-CM

## 2018-01-08 DIAGNOSIS — E03.9 HYPOTHYROIDISM, UNSPECIFIED: ICD-10-CM
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
## 2018-01-08 DIAGNOSIS — K21.9 GASTRO-ESOPHAGEAL REFLUX DISEASE WITHOUT ESOPHAGITIS: ICD-10-CM

## 2018-01-08 DIAGNOSIS — S61.211A LACERATION WITHOUT FOREIGN BODY OF LEFT INDEX FINGER WITHOUT DAMAGE TO NAIL, INITIAL ENCOUNTER: ICD-10-CM

## 2018-01-08 DIAGNOSIS — S61.213A LACERATION WITHOUT FOREIGN BODY OF LEFT MIDDLE FINGER WITHOUT DAMAGE TO NAIL, INITIAL ENCOUNTER: ICD-10-CM

## 2018-01-08 PROCEDURE — 99283 EMERGENCY DEPT VISIT LOW MDM: CPT | Mod: 25

## 2018-01-08 PROCEDURE — 12001 RPR S/N/AX/GEN/TRNK 2.5CM/<: CPT | Mod: LT

## 2018-01-08 RX ORDER — CEPHALEXIN 500 MG
1 CAPSULE ORAL
Qty: 10 | Refills: 0
Start: 2018-01-08 | End: 2018-01-12

## 2018-01-08 NOTE — ED STATDOCS - PRINCIPAL DIAGNOSIS
Laceration of finger of left hand without foreign body without damage to nail, unspecified finger, initial encounter

## 2018-01-08 NOTE — ED STATDOCS - PROGRESS NOTE DETAILS
signed Mandi Biggs PA-C Pt seen initially in intake by Dr. Calle   Pt with superficial lacerations of left 2nd and third fingertips. Gross motor/sensation intact. No FDS/FDP deficit. No nailbed injury. Wound cleansed extensively. Will place pt on abx ppx for hx DM. outpt f/u PMD PRN. Pt feeling well, agrees with DC and plan of care.

## 2018-01-08 NOTE — ED STATDOCS - SKIN, MLM
2superficial lacerations in the left 2nd and third, neurovascular intact, cap refill less than 2 seconds

## 2018-01-08 NOTE — ED STATDOCS - CARE PLAN
Principal Discharge DX:	Laceration of finger of left hand without foreign body without damage to nail, unspecified finger, initial encounter

## 2018-01-08 NOTE — ED STATDOCS - OBJECTIVE STATEMENT
58 y/o female with a PMHx of HLD, DM presents to the ED c/o superficial to the left 2 and 3 digits. Pt was making a storage box for kittens while cutting into plastic which slipped. Tetanus up to date.

## 2018-01-08 NOTE — ED ADULT TRIAGE NOTE - CHIEF COMPLAINT QUOTE
left hand laceration from a garden scissor about 30 mins ago. Lacaeration on index finger and ring finger.

## 2018-01-18 ENCOUNTER — APPOINTMENT (OUTPATIENT)
Dept: CARDIOLOGY | Facility: CLINIC | Age: 58
End: 2018-01-18
Payer: MEDICARE

## 2018-01-18 ENCOUNTER — NON-APPOINTMENT (OUTPATIENT)
Age: 58
End: 2018-01-18

## 2018-01-18 VITALS
OXYGEN SATURATION: 96 % | BODY MASS INDEX: 28.35 KG/M2 | HEIGHT: 63 IN | SYSTOLIC BLOOD PRESSURE: 121 MMHG | WEIGHT: 160 LBS | HEART RATE: 73 BPM | DIASTOLIC BLOOD PRESSURE: 85 MMHG

## 2018-01-18 PROCEDURE — 99214 OFFICE O/P EST MOD 30 MIN: CPT

## 2018-01-18 PROCEDURE — 93000 ELECTROCARDIOGRAM COMPLETE: CPT

## 2018-01-25 ENCOUNTER — OUTPATIENT (OUTPATIENT)
Dept: OUTPATIENT SERVICES | Facility: HOSPITAL | Age: 58
LOS: 1 days | Discharge: HOME | End: 2018-01-25

## 2018-01-25 DIAGNOSIS — E78.5 HYPERLIPIDEMIA, UNSPECIFIED: ICD-10-CM

## 2018-01-25 DIAGNOSIS — R73.09 OTHER ABNORMAL GLUCOSE: ICD-10-CM

## 2018-01-25 DIAGNOSIS — R79.89 OTHER SPECIFIED ABNORMAL FINDINGS OF BLOOD CHEMISTRY: ICD-10-CM

## 2018-01-25 DIAGNOSIS — R00.2 PALPITATIONS: ICD-10-CM

## 2018-01-25 LAB
ALBUMIN SERPL ELPH-MCNC: 4.3 G/DL — SIGNIFICANT CHANGE UP (ref 3.3–5)
ALP SERPL-CCNC: 68 U/L — SIGNIFICANT CHANGE UP (ref 40–120)
ALT FLD-CCNC: 41 U/L — SIGNIFICANT CHANGE UP (ref 12–78)
AST SERPL-CCNC: 17 U/L — SIGNIFICANT CHANGE UP (ref 15–37)
BILIRUB DIRECT SERPL-MCNC: 0.1 MG/DL — SIGNIFICANT CHANGE UP (ref 0–0.2)
BILIRUB INDIRECT FLD-MCNC: 0.5 MG/DL — SIGNIFICANT CHANGE UP (ref 0.2–1)
BILIRUB SERPL-MCNC: 0.6 MG/DL — SIGNIFICANT CHANGE UP (ref 0.2–1.2)
PROT SERPL-MCNC: 8 GM/DL — SIGNIFICANT CHANGE UP (ref 6–8.3)

## 2018-02-05 ENCOUNTER — RX RENEWAL (OUTPATIENT)
Age: 58
End: 2018-02-05

## 2018-02-06 ENCOUNTER — OTHER (OUTPATIENT)
Age: 58
End: 2018-02-06

## 2018-02-06 RX ORDER — METOPROLOL TARTRATE 25 MG/1
25 TABLET, FILM COATED ORAL TWICE DAILY
Qty: 180 | Refills: 2 | Status: DISCONTINUED | COMMUNITY
End: 2018-02-06

## 2018-03-28 ENCOUNTER — MEDICATION RENEWAL (OUTPATIENT)
Age: 58
End: 2018-03-28

## 2018-06-07 ENCOUNTER — APPOINTMENT (OUTPATIENT)
Dept: CARDIOLOGY | Facility: CLINIC | Age: 58
End: 2018-06-07
Payer: MEDICARE

## 2018-06-07 ENCOUNTER — NON-APPOINTMENT (OUTPATIENT)
Age: 58
End: 2018-06-07

## 2018-06-07 VITALS — SYSTOLIC BLOOD PRESSURE: 122 MMHG | HEART RATE: 60 BPM | DIASTOLIC BLOOD PRESSURE: 82 MMHG

## 2018-06-07 PROCEDURE — 93000 ELECTROCARDIOGRAM COMPLETE: CPT

## 2018-06-07 PROCEDURE — 99214 OFFICE O/P EST MOD 30 MIN: CPT

## 2018-06-08 LAB
ALBUMIN SERPL ELPH-MCNC: 4.3 G/DL
ALP BLD-CCNC: 57 U/L
ALT SERPL-CCNC: 25 U/L
ANION GAP SERPL CALC-SCNC: 14 MMOL/L
AST SERPL-CCNC: 21 U/L
BILIRUB SERPL-MCNC: 0.2 MG/DL
BUN SERPL-MCNC: 12 MG/DL
CALCIUM SERPL-MCNC: 9.4 MG/DL
CHLORIDE SERPL-SCNC: 104 MMOL/L
CHOLEST SERPL-MCNC: 154 MG/DL
CHOLEST/HDLC SERPL: 3 RATIO
CO2 SERPL-SCNC: 24 MMOL/L
CREAT SERPL-MCNC: 0.64 MG/DL
GLUCOSE SERPL-MCNC: 105 MG/DL
HBA1C MFR BLD HPLC: 5.3 %
HDLC SERPL-MCNC: 52 MG/DL
LDLC SERPL CALC-MCNC: 70 MG/DL
POTASSIUM SERPL-SCNC: 4.3 MMOL/L
PROT SERPL-MCNC: 6.9 G/DL
SODIUM SERPL-SCNC: 142 MMOL/L
TRIGL SERPL-MCNC: 160 MG/DL
TSH SERPL-ACNC: 0.99 UIU/ML

## 2018-08-13 ENCOUNTER — RX RENEWAL (OUTPATIENT)
Age: 58
End: 2018-08-13

## 2018-09-05 ENCOUNTER — APPOINTMENT (OUTPATIENT)
Dept: CARDIOLOGY | Facility: CLINIC | Age: 58
End: 2018-09-05

## 2018-10-03 ENCOUNTER — OTHER (OUTPATIENT)
Age: 58
End: 2018-10-03

## 2018-10-12 ENCOUNTER — MEDICATION RENEWAL (OUTPATIENT)
Age: 58
End: 2018-10-12

## 2018-10-12 ENCOUNTER — RX RENEWAL (OUTPATIENT)
Age: 58
End: 2018-10-12

## 2018-10-23 LAB
ALBUMIN SERPL ELPH-MCNC: 4.9 G/DL
ALP BLD-CCNC: 68 U/L
ALT SERPL-CCNC: 37 U/L
ANION GAP SERPL CALC-SCNC: 12 MMOL/L
AST SERPL-CCNC: 24 U/L
BILIRUB SERPL-MCNC: 0.4 MG/DL
BUN SERPL-MCNC: 11 MG/DL
CALCIUM SERPL-MCNC: 9.7 MG/DL
CHLORIDE SERPL-SCNC: 101 MMOL/L
CHOLEST SERPL-MCNC: 181 MG/DL
CHOLEST/HDLC SERPL: 2.7 RATIO
CO2 SERPL-SCNC: 27 MMOL/L
CREAT SERPL-MCNC: 0.65 MG/DL
GLUCOSE SERPL-MCNC: 108 MG/DL
HDLC SERPL-MCNC: 66 MG/DL
LDLC SERPL CALC-MCNC: 89 MG/DL
POTASSIUM SERPL-SCNC: 4.2 MMOL/L
PROT SERPL-MCNC: 7.5 G/DL
SODIUM SERPL-SCNC: 140 MMOL/L
TRIGL SERPL-MCNC: 129 MG/DL
TSH SERPL-ACNC: 2.83 UIU/ML

## 2018-11-05 ENCOUNTER — OTHER (OUTPATIENT)
Age: 58
End: 2018-11-05

## 2019-01-07 ENCOUNTER — RX RENEWAL (OUTPATIENT)
Age: 59
End: 2019-01-07

## 2019-01-09 ENCOUNTER — RX RENEWAL (OUTPATIENT)
Age: 59
End: 2019-01-09

## 2019-01-14 ENCOUNTER — RX RENEWAL (OUTPATIENT)
Age: 59
End: 2019-01-14

## 2019-06-12 ENCOUNTER — APPOINTMENT (OUTPATIENT)
Dept: CARDIOLOGY | Facility: CLINIC | Age: 59
End: 2019-06-12
Payer: MEDICARE

## 2019-06-12 VITALS
BODY MASS INDEX: 29.23 KG/M2 | WEIGHT: 165 LBS | HEIGHT: 63 IN | OXYGEN SATURATION: 96 % | DIASTOLIC BLOOD PRESSURE: 82 MMHG | SYSTOLIC BLOOD PRESSURE: 117 MMHG | HEART RATE: 66 BPM

## 2019-06-12 PROCEDURE — 99214 OFFICE O/P EST MOD 30 MIN: CPT

## 2019-06-12 PROCEDURE — 93000 ELECTROCARDIOGRAM COMPLETE: CPT

## 2019-06-13 ENCOUNTER — NON-APPOINTMENT (OUTPATIENT)
Age: 59
End: 2019-06-13

## 2019-06-13 NOTE — DISCUSSION/SUMMARY
[FreeTextEntry1] : Emely is a 56 y/o woman HTN and Hchol, A-tach on prior holter\par \par Plan on 7D Zio patch to assess palpitations.\par No change in meds for now

## 2019-06-13 NOTE — REVIEW OF SYSTEMS
[Palpitations] : palpitations [Anxiety] : anxiety [Negative] : Endocrine [Feeling Fatigued] : not feeling fatigued

## 2019-06-13 NOTE — HISTORY OF PRESENT ILLNESS
[FreeTextEntry1] : Emely is returning for a follow-up.\par \par Notes a 8-10 min episode of rapid HR (130-140) which self resolved\par No chest pains or tightness\par No syncope\par No SMITH or PND\par No recent labs\par

## 2019-06-13 NOTE — PHYSICAL EXAM
[General Appearance - Well Developed] : well developed [Normal Appearance] : normal appearance [General Appearance - Well Nourished] : well nourished [Well Groomed] : well groomed [No Deformities] : no deformities [General Appearance - In No Acute Distress] : no acute distress [Normal Conjunctiva] : the conjunctiva exhibited no abnormalities [Normal Oral Mucosa] : normal oral mucosa [Eyelids - No Xanthelasma] : the eyelids demonstrated no xanthelasmas [No Oral Pallor] : no oral pallor [No Oral Cyanosis] : no oral cyanosis [Normal Jugular Venous A Waves Present] : normal jugular venous A waves present [Normal Jugular Venous V Waves Present] : normal jugular venous V waves present [No Jugular Venous Martin A Waves] : no jugular venous martin A waves [Respiration, Rhythm And Depth] : normal respiratory rhythm and effort [Exaggerated Use Of Accessory Muscles For Inspiration] : no accessory muscle use [Auscultation Breath Sounds / Voice Sounds] : lungs were clear to auscultation bilaterally [Heart Rate And Rhythm] : heart rate and rhythm were normal [Heart Sounds] : normal S1 and S2 [Abdomen Tenderness] : non-tender [Murmurs] : no murmurs present [Abdomen Soft] : soft [Abdomen Mass (___ Cm)] : no abdominal mass palpated [Abnormal Walk] : normal gait [Nail Clubbing] : no clubbing of the fingernails [Gait - Sufficient For Exercise Testing] : the gait was sufficient for exercise testing [Cyanosis, Localized] : no localized cyanosis [Petechial Hemorrhages (___cm)] : no petechial hemorrhages [] : no ischemic changes [Skin Color & Pigmentation] : normal skin color and pigmentation [No Skin Ulcers] : no skin ulcer [Skin Lesions] : no skin lesions [No Venous Stasis] : no venous stasis [Affect] : the affect was normal [No Xanthoma] : no  xanthoma was observed [Oriented To Time, Place, And Person] : oriented to person, place, and time [No Anxiety] : not feeling anxious [Mood] : the mood was normal

## 2019-10-14 ENCOUNTER — RX RENEWAL (OUTPATIENT)
Age: 59
End: 2019-10-14

## 2019-12-12 ENCOUNTER — APPOINTMENT (OUTPATIENT)
Dept: CARDIOLOGY | Facility: CLINIC | Age: 59
End: 2019-12-12
Payer: COMMERCIAL

## 2019-12-12 VITALS
HEART RATE: 66 BPM | OXYGEN SATURATION: 97 % | DIASTOLIC BLOOD PRESSURE: 79 MMHG | WEIGHT: 165 LBS | HEIGHT: 63 IN | SYSTOLIC BLOOD PRESSURE: 118 MMHG | BODY MASS INDEX: 29.23 KG/M2

## 2019-12-12 PROCEDURE — 93000 ELECTROCARDIOGRAM COMPLETE: CPT

## 2019-12-12 PROCEDURE — 99213 OFFICE O/P EST LOW 20 MIN: CPT

## 2020-01-05 ENCOUNTER — NON-APPOINTMENT (OUTPATIENT)
Age: 60
End: 2020-01-05

## 2020-01-05 NOTE — DISCUSSION/SUMMARY
[FreeTextEntry1] : Emely is a 58 y/o woman HTN ,Hchol, paroxysmal A-tach  with an increase in dyspnea\par \par I recc a stress echo to further eval her symptoms\par \par \par

## 2020-01-05 NOTE — PHYSICAL EXAM
[General Appearance - Well Developed] : well developed [Normal Appearance] : normal appearance [General Appearance - Well Nourished] : well nourished [Well Groomed] : well groomed [No Deformities] : no deformities [Normal Oral Mucosa] : normal oral mucosa [Normal Conjunctiva] : the conjunctiva exhibited no abnormalities [Eyelids - No Xanthelasma] : the eyelids demonstrated no xanthelasmas [General Appearance - In No Acute Distress] : no acute distress [No Oral Cyanosis] : no oral cyanosis [No Oral Pallor] : no oral pallor [Normal Jugular Venous A Waves Present] : normal jugular venous A waves present [Normal Jugular Venous V Waves Present] : normal jugular venous V waves present [No Jugular Venous Martin A Waves] : no jugular venous martin A waves [Auscultation Breath Sounds / Voice Sounds] : lungs were clear to auscultation bilaterally [Exaggerated Use Of Accessory Muscles For Inspiration] : no accessory muscle use [Respiration, Rhythm And Depth] : normal respiratory rhythm and effort [Heart Rate And Rhythm] : heart rate and rhythm were normal [Murmurs] : no murmurs present [Heart Sounds] : normal S1 and S2 [Abdomen Soft] : soft [Abdomen Mass (___ Cm)] : no abdominal mass palpated [Abdomen Tenderness] : non-tender [Abnormal Walk] : normal gait [Nail Clubbing] : no clubbing of the fingernails [Gait - Sufficient For Exercise Testing] : the gait was sufficient for exercise testing [Cyanosis, Localized] : no localized cyanosis [] : no ischemic changes [Petechial Hemorrhages (___cm)] : no petechial hemorrhages [Skin Color & Pigmentation] : normal skin color and pigmentation [Skin Lesions] : no skin lesions [No Skin Ulcers] : no skin ulcer [No Venous Stasis] : no venous stasis [No Xanthoma] : no  xanthoma was observed [Oriented To Time, Place, And Person] : oriented to person, place, and time [Affect] : the affect was normal [Mood] : the mood was normal [No Anxiety] : not feeling anxious

## 2020-01-05 NOTE — HISTORY OF PRESENT ILLNESS
[FreeTextEntry1] : Emely is returning for a follow-up.\par \par Has felt more SOB and fatigue than normal\par No chest pains or tightness\par Her symptoms mostly occur with exertion and are relieved with rest\par \par No syncope \par No palps

## 2020-01-08 ENCOUNTER — OUTPATIENT (OUTPATIENT)
Dept: OUTPATIENT SERVICES | Facility: HOSPITAL | Age: 60
LOS: 1 days | End: 2020-01-08
Payer: COMMERCIAL

## 2020-01-08 ENCOUNTER — APPOINTMENT (OUTPATIENT)
Dept: CV DIAGNOSITCS | Facility: HOSPITAL | Age: 60
End: 2020-01-08

## 2020-01-08 DIAGNOSIS — R00.2 PALPITATIONS: ICD-10-CM

## 2020-01-08 PROCEDURE — 93350 STRESS TTE ONLY: CPT | Mod: 26

## 2020-01-08 PROCEDURE — 93351 STRESS TTE COMPLETE: CPT

## 2020-01-08 PROCEDURE — 93320 DOPPLER ECHO COMPLETE: CPT

## 2020-01-08 PROCEDURE — 93325 DOPPLER ECHO COLOR FLOW MAPG: CPT

## 2020-01-08 PROCEDURE — 93325 DOPPLER ECHO COLOR FLOW MAPG: CPT | Mod: 26

## 2020-01-08 PROCEDURE — 93320 DOPPLER ECHO COMPLETE: CPT | Mod: 26

## 2020-07-17 ENCOUNTER — TRANSCRIPTION ENCOUNTER (OUTPATIENT)
Age: 60
End: 2020-07-17

## 2020-08-13 ENCOUNTER — APPOINTMENT (OUTPATIENT)
Dept: CARDIOLOGY | Facility: CLINIC | Age: 60
End: 2020-08-13
Payer: COMMERCIAL

## 2020-08-13 ENCOUNTER — NON-APPOINTMENT (OUTPATIENT)
Age: 60
End: 2020-08-13

## 2020-08-13 VITALS
DIASTOLIC BLOOD PRESSURE: 68 MMHG | HEART RATE: 69 BPM | SYSTOLIC BLOOD PRESSURE: 97 MMHG | WEIGHT: 166 LBS | HEIGHT: 63 IN | OXYGEN SATURATION: 98 % | BODY MASS INDEX: 29.41 KG/M2

## 2020-08-13 PROCEDURE — 99213 OFFICE O/P EST LOW 20 MIN: CPT

## 2020-08-13 PROCEDURE — 93000 ELECTROCARDIOGRAM COMPLETE: CPT

## 2020-08-15 ENCOUNTER — TRANSCRIPTION ENCOUNTER (OUTPATIENT)
Age: 60
End: 2020-08-15

## 2020-08-18 LAB
25(OH)D3 SERPL-MCNC: 42.5 NG/ML
ALBUMIN SERPL ELPH-MCNC: 5 G/DL
ALP BLD-CCNC: 76 U/L
ALT SERPL-CCNC: 37 U/L
ANION GAP SERPL CALC-SCNC: 11 MMOL/L
AST SERPL-CCNC: 28 U/L
BASOPHILS # BLD AUTO: 0.02 K/UL
BASOPHILS NFR BLD AUTO: 0.3 %
BILIRUB SERPL-MCNC: 0.4 MG/DL
BUN SERPL-MCNC: 13 MG/DL
CALCIUM SERPL-MCNC: 10.2 MG/DL
CHLORIDE SERPL-SCNC: 103 MMOL/L
CHOLEST SERPL-MCNC: 181 MG/DL
CHOLEST/HDLC SERPL: 3.8 RATIO
CO2 SERPL-SCNC: 26 MMOL/L
CREAT SERPL-MCNC: 0.76 MG/DL
EOSINOPHIL # BLD AUTO: 0.2 K/UL
EOSINOPHIL NFR BLD AUTO: 3 %
ESTIMATED AVERAGE GLUCOSE: 111 MG/DL
GLUCOSE SERPL-MCNC: 124 MG/DL
HBA1C MFR BLD HPLC: 5.5 %
HCT VFR BLD CALC: 39.7 %
HDLC SERPL-MCNC: 47 MG/DL
HGB BLD-MCNC: 12.9 G/DL
IMM GRANULOCYTES NFR BLD AUTO: 0.3 %
LDLC SERPL CALC-MCNC: 83 MG/DL
LYMPHOCYTES # BLD AUTO: 2.17 K/UL
LYMPHOCYTES NFR BLD AUTO: 32.4 %
MAN DIFF?: NORMAL
MCHC RBC-ENTMCNC: 31.3 PG
MCHC RBC-ENTMCNC: 32.5 GM/DL
MCV RBC AUTO: 96.4 FL
MONOCYTES # BLD AUTO: 0.37 K/UL
MONOCYTES NFR BLD AUTO: 5.5 %
NEUTROPHILS # BLD AUTO: 3.91 K/UL
NEUTROPHILS NFR BLD AUTO: 58.5 %
PLATELET # BLD AUTO: 255 K/UL
POTASSIUM SERPL-SCNC: 4.4 MMOL/L
PROT SERPL-MCNC: 7.1 G/DL
RBC # BLD: 4.12 M/UL
RBC # FLD: 13 %
SODIUM SERPL-SCNC: 140 MMOL/L
TRIGL SERPL-MCNC: 256 MG/DL
TSH SERPL-ACNC: 0.84 UIU/ML
WBC # FLD AUTO: 6.69 K/UL

## 2020-08-26 LAB
CHOLEST SERPL-MCNC: 183 MG/DL
CHOLEST/HDLC SERPL: 3.7 RATIO
HDLC SERPL-MCNC: 50 MG/DL
LDLC SERPL CALC-MCNC: 96 MG/DL
TRIGL SERPL-MCNC: 183 MG/DL

## 2020-08-27 ENCOUNTER — RX RENEWAL (OUTPATIENT)
Age: 60
End: 2020-08-27

## 2020-09-27 NOTE — HISTORY OF PRESENT ILLNESS
[FreeTextEntry1] : Emely is returning for a follow-up.\par \par No chest pains or tightness\par No syncope \par No palps

## 2020-09-27 NOTE — REVIEW OF SYSTEMS
[Anxiety] : anxiety [Negative] : Heme/Lymph [Feeling Fatigued] : not feeling fatigued [Palpitations] : no palpitations

## 2020-09-27 NOTE — DISCUSSION/SUMMARY
[FreeTextEntry1] : Emely is a 59 y/o woman HTN ,Hchol, paroxysmal A-tach  here for F/u\par \par No change in meds\par Palps stable\par Check labs\par \par \par

## 2020-09-27 NOTE — PHYSICAL EXAM
[General Appearance - Well Developed] : well developed [Normal Appearance] : normal appearance [Well Groomed] : well groomed [General Appearance - Well Nourished] : well nourished [No Deformities] : no deformities [General Appearance - In No Acute Distress] : no acute distress [Normal Conjunctiva] : the conjunctiva exhibited no abnormalities [Eyelids - No Xanthelasma] : the eyelids demonstrated no xanthelasmas [Normal Oral Mucosa] : normal oral mucosa [No Oral Pallor] : no oral pallor [No Oral Cyanosis] : no oral cyanosis [Normal Jugular Venous A Waves Present] : normal jugular venous A waves present [Normal Jugular Venous V Waves Present] : normal jugular venous V waves present [No Jugular Venous Martin A Waves] : no jugular venous martin A waves [Respiration, Rhythm And Depth] : normal respiratory rhythm and effort [Exaggerated Use Of Accessory Muscles For Inspiration] : no accessory muscle use [Auscultation Breath Sounds / Voice Sounds] : lungs were clear to auscultation bilaterally [Heart Rate And Rhythm] : heart rate and rhythm were normal [Heart Sounds] : normal S1 and S2 [Murmurs] : no murmurs present [Abdomen Soft] : soft [Abdomen Tenderness] : non-tender [Abdomen Mass (___ Cm)] : no abdominal mass palpated [Abnormal Walk] : normal gait [Gait - Sufficient For Exercise Testing] : the gait was sufficient for exercise testing [Nail Clubbing] : no clubbing of the fingernails [Cyanosis, Localized] : no localized cyanosis [Petechial Hemorrhages (___cm)] : no petechial hemorrhages [Skin Color & Pigmentation] : normal skin color and pigmentation [] : no rash [No Venous Stasis] : no venous stasis [Skin Lesions] : no skin lesions [No Skin Ulcers] : no skin ulcer [No Xanthoma] : no  xanthoma was observed [Oriented To Time, Place, And Person] : oriented to person, place, and time [Affect] : the affect was normal [Mood] : the mood was normal [No Anxiety] : not feeling anxious

## 2021-02-11 ENCOUNTER — RX RENEWAL (OUTPATIENT)
Age: 61
End: 2021-02-11

## 2021-02-24 ENCOUNTER — NON-APPOINTMENT (OUTPATIENT)
Age: 61
End: 2021-02-24

## 2021-02-24 ENCOUNTER — APPOINTMENT (OUTPATIENT)
Dept: CARDIOLOGY | Facility: CLINIC | Age: 61
End: 2021-02-24
Payer: COMMERCIAL

## 2021-02-24 VITALS
WEIGHT: 169 LBS | SYSTOLIC BLOOD PRESSURE: 110 MMHG | DIASTOLIC BLOOD PRESSURE: 79 MMHG | HEART RATE: 71 BPM | BODY MASS INDEX: 29.95 KG/M2 | HEIGHT: 63 IN | OXYGEN SATURATION: 96 %

## 2021-02-24 PROCEDURE — 99213 OFFICE O/P EST LOW 20 MIN: CPT

## 2021-02-24 PROCEDURE — 93000 ELECTROCARDIOGRAM COMPLETE: CPT

## 2021-02-24 PROCEDURE — 99072 ADDL SUPL MATRL&STAF TM PHE: CPT

## 2021-02-24 NOTE — PHYSICAL EXAM
[General Appearance - Well Developed] : well developed [Normal Appearance] : normal appearance [Well Groomed] : well groomed [General Appearance - Well Nourished] : well nourished [No Deformities] : no deformities [General Appearance - In No Acute Distress] : no acute distress [Normal Conjunctiva] : the conjunctiva exhibited no abnormalities [Eyelids - No Xanthelasma] : the eyelids demonstrated no xanthelasmas [Normal Oral Mucosa] : normal oral mucosa [No Oral Pallor] : no oral pallor [No Oral Cyanosis] : no oral cyanosis [Normal Jugular Venous A Waves Present] : normal jugular venous A waves present [Normal Jugular Venous V Waves Present] : normal jugular venous V waves present [No Jugular Venous Martin A Waves] : no jugular venous martin A waves [Respiration, Rhythm And Depth] : normal respiratory rhythm and effort [Exaggerated Use Of Accessory Muscles For Inspiration] : no accessory muscle use [Auscultation Breath Sounds / Voice Sounds] : lungs were clear to auscultation bilaterally [Heart Rate And Rhythm] : heart rate and rhythm were normal [Heart Sounds] : normal S1 and S2 [Murmurs] : no murmurs present [Abdomen Soft] : soft [Abdomen Tenderness] : non-tender [Abdomen Mass (___ Cm)] : no abdominal mass palpated [Abnormal Walk] : normal gait [Gait - Sufficient For Exercise Testing] : the gait was sufficient for exercise testing [Nail Clubbing] : no clubbing of the fingernails [Cyanosis, Localized] : no localized cyanosis [Petechial Hemorrhages (___cm)] : no petechial hemorrhages [Skin Color & Pigmentation] : normal skin color and pigmentation [] : no rash [No Venous Stasis] : no venous stasis [Skin Lesions] : no skin lesions [No Skin Ulcers] : no skin ulcer [No Xanthoma] : no  xanthoma was observed [Oriented To Time, Place, And Person] : oriented to person, place, and time [Affect] : the affect was normal [No Anxiety] : not feeling anxious [Mood] : the mood was normal

## 2021-02-25 NOTE — DISCUSSION/SUMMARY
[FreeTextEntry1] : Emely is a 59 y/o woman HTN ,Hchol, paroxysmal A-tach; recent admission for episode of SVT here for F/u\par \par Increase metoprolol to 75mg daily\par Repeat lipids to recheck trg\par F/u in April\par \par \par \par

## 2021-02-25 NOTE — HISTORY OF PRESENT ILLNESS
[FreeTextEntry1] : Emely is returning for a follow-up.\par \par Episode of SVT requiring hospital visit (ER visit, converted to NSR upon arrival)\par \par No chest pains or tightness\par No syncope \par Has felt well since\par Concerned that it may reoccur

## 2021-02-26 RX ORDER — METOPROLOL SUCCINATE 25 MG/1
25 TABLET, EXTENDED RELEASE ORAL
Qty: 90 | Refills: 3 | Status: DISCONTINUED | COMMUNITY
Start: 2021-02-24 | End: 2021-02-26

## 2021-03-03 LAB
ALBUMIN SERPL ELPH-MCNC: 4.9 G/DL
ALP BLD-CCNC: 78 U/L
ALT SERPL-CCNC: 46 U/L
ANION GAP SERPL CALC-SCNC: 13 MMOL/L
AST SERPL-CCNC: 26 U/L
BILIRUB SERPL-MCNC: 0.4 MG/DL
BUN SERPL-MCNC: 13 MG/DL
CALCIUM SERPL-MCNC: 10.2 MG/DL
CHLORIDE SERPL-SCNC: 102 MMOL/L
CHOLEST SERPL-MCNC: 200 MG/DL
CO2 SERPL-SCNC: 25 MMOL/L
CREAT SERPL-MCNC: 0.82 MG/DL
GLUCOSE SERPL-MCNC: 98 MG/DL
HDLC SERPL-MCNC: 52 MG/DL
LDLC SERPL CALC-MCNC: 99 MG/DL
NONHDLC SERPL-MCNC: 147 MG/DL
POTASSIUM SERPL-SCNC: 4.9 MMOL/L
PROT SERPL-MCNC: 7.5 G/DL
SODIUM SERPL-SCNC: 139 MMOL/L
TRIGL SERPL-MCNC: 241 MG/DL

## 2021-03-22 NOTE — ED STATDOCS - CHPI ED RELIEVING FACTORS
Sorry I was a day behind! Rx sent to [id number]. If you could just follow up with them (and mom) and schedule her for a month after starting meds (flexible).
nothing

## 2021-03-29 ENCOUNTER — FORM ENCOUNTER (OUTPATIENT)
Age: 61
End: 2021-03-29

## 2021-04-24 ENCOUNTER — APPOINTMENT (OUTPATIENT)
Dept: DISASTER EMERGENCY | Facility: CLINIC | Age: 61
End: 2021-04-24

## 2021-04-24 ENCOUNTER — LABORATORY RESULT (OUTPATIENT)
Age: 61
End: 2021-04-24

## 2021-04-25 ENCOUNTER — APPOINTMENT (OUTPATIENT)
Dept: DISASTER EMERGENCY | Facility: CLINIC | Age: 61
End: 2021-04-25

## 2021-04-27 ENCOUNTER — RESULT REVIEW (OUTPATIENT)
Age: 61
End: 2021-04-27

## 2021-04-27 ENCOUNTER — OUTPATIENT (OUTPATIENT)
Dept: OUTPATIENT SERVICES | Facility: HOSPITAL | Age: 61
LOS: 1 days | Discharge: HOME | End: 2021-04-27
Payer: COMMERCIAL

## 2021-04-27 VITALS
HEART RATE: 73 BPM | HEIGHT: 63 IN | RESPIRATION RATE: 16 BRPM | SYSTOLIC BLOOD PRESSURE: 130 MMHG | TEMPERATURE: 97 F | DIASTOLIC BLOOD PRESSURE: 77 MMHG | WEIGHT: 164.02 LBS | OXYGEN SATURATION: 98 %

## 2021-04-27 DIAGNOSIS — K21.9 GASTRO-ESOPHAGEAL REFLUX DISEASE WITHOUT ESOPHAGITIS: ICD-10-CM

## 2021-04-27 DIAGNOSIS — K64.8 OTHER HEMORRHOIDS: ICD-10-CM

## 2021-04-27 DIAGNOSIS — K44.9 DIAPHRAGMATIC HERNIA WITHOUT OBSTRUCTION OR GANGRENE: ICD-10-CM

## 2021-04-27 DIAGNOSIS — Z98.890 OTHER SPECIFIED POSTPROCEDURAL STATES: Chronic | ICD-10-CM

## 2021-04-27 DIAGNOSIS — Z87.891 PERSONAL HISTORY OF NICOTINE DEPENDENCE: ICD-10-CM

## 2021-04-27 DIAGNOSIS — Z88.5 ALLERGY STATUS TO NARCOTIC AGENT: ICD-10-CM

## 2021-04-27 DIAGNOSIS — Z79.899 OTHER LONG TERM (CURRENT) DRUG THERAPY: ICD-10-CM

## 2021-04-27 DIAGNOSIS — Z86.010 PERSONAL HISTORY OF COLONIC POLYPS: ICD-10-CM

## 2021-04-27 DIAGNOSIS — K62.1 RECTAL POLYP: ICD-10-CM

## 2021-04-27 DIAGNOSIS — Z90.49 ACQUIRED ABSENCE OF OTHER SPECIFIED PARTS OF DIGESTIVE TRACT: Chronic | ICD-10-CM

## 2021-04-27 DIAGNOSIS — K63.5 POLYP OF COLON: ICD-10-CM

## 2021-04-27 DIAGNOSIS — E06.3 AUTOIMMUNE THYROIDITIS: ICD-10-CM

## 2021-04-27 DIAGNOSIS — R19.7 DIARRHEA, UNSPECIFIED: ICD-10-CM

## 2021-04-27 DIAGNOSIS — R19.4 CHANGE IN BOWEL HABIT: ICD-10-CM

## 2021-04-27 PROCEDURE — 88305 TISSUE EXAM BY PATHOLOGIST: CPT | Mod: 26

## 2021-04-27 PROCEDURE — 88305 TISSUE EXAM BY PATHOLOGIST: CPT

## 2021-04-27 RX ORDER — LUTEIN 20 MG
1 CAPSULE ORAL
Qty: 0 | Refills: 0 | DISCHARGE

## 2021-05-29 ENCOUNTER — FORM ENCOUNTER (OUTPATIENT)
Age: 61
End: 2021-05-29

## 2021-05-30 ENCOUNTER — EMERGENCY (EMERGENCY)
Facility: HOSPITAL | Age: 61
LOS: 0 days | Discharge: HOME | End: 2021-05-30
Attending: EMERGENCY MEDICINE
Payer: COMMERCIAL

## 2021-05-30 VITALS
DIASTOLIC BLOOD PRESSURE: 80 MMHG | OXYGEN SATURATION: 94 % | HEIGHT: 63 IN | SYSTOLIC BLOOD PRESSURE: 125 MMHG | HEART RATE: 92 BPM | TEMPERATURE: 98 F | WEIGHT: 164.02 LBS

## 2021-05-30 VITALS
RESPIRATION RATE: 18 BRPM | HEART RATE: 76 BPM | TEMPERATURE: 98 F | SYSTOLIC BLOOD PRESSURE: 112 MMHG | DIASTOLIC BLOOD PRESSURE: 79 MMHG | OXYGEN SATURATION: 97 %

## 2021-05-30 DIAGNOSIS — Z98.890 OTHER SPECIFIED POSTPROCEDURAL STATES: Chronic | ICD-10-CM

## 2021-05-30 DIAGNOSIS — F41.9 ANXIETY DISORDER, UNSPECIFIED: ICD-10-CM

## 2021-05-30 DIAGNOSIS — E03.9 HYPOTHYROIDISM, UNSPECIFIED: ICD-10-CM

## 2021-05-30 DIAGNOSIS — R00.2 PALPITATIONS: ICD-10-CM

## 2021-05-30 DIAGNOSIS — Z90.49 ACQUIRED ABSENCE OF OTHER SPECIFIED PARTS OF DIGESTIVE TRACT: Chronic | ICD-10-CM

## 2021-05-30 DIAGNOSIS — F32.9 MAJOR DEPRESSIVE DISORDER, SINGLE EPISODE, UNSPECIFIED: ICD-10-CM

## 2021-05-30 DIAGNOSIS — K21.9 GASTRO-ESOPHAGEAL REFLUX DISEASE WITHOUT ESOPHAGITIS: ICD-10-CM

## 2021-05-30 DIAGNOSIS — Z88.5 ALLERGY STATUS TO NARCOTIC AGENT: ICD-10-CM

## 2021-05-30 DIAGNOSIS — Z79.899 OTHER LONG TERM (CURRENT) DRUG THERAPY: ICD-10-CM

## 2021-05-30 DIAGNOSIS — E78.5 HYPERLIPIDEMIA, UNSPECIFIED: ICD-10-CM

## 2021-05-30 DIAGNOSIS — Z79.890 HORMONE REPLACEMENT THERAPY: ICD-10-CM

## 2021-05-30 LAB
ALBUMIN SERPL ELPH-MCNC: 4 G/DL — SIGNIFICANT CHANGE UP (ref 3.3–5)
ALP SERPL-CCNC: 81 U/L — SIGNIFICANT CHANGE UP (ref 40–120)
ALT FLD-CCNC: 60 U/L — SIGNIFICANT CHANGE UP (ref 12–78)
ANION GAP SERPL CALC-SCNC: 4 MMOL/L — LOW (ref 5–17)
AST SERPL-CCNC: 58 U/L — HIGH (ref 15–37)
BASOPHILS # BLD AUTO: 0.03 K/UL — SIGNIFICANT CHANGE UP (ref 0–0.2)
BASOPHILS NFR BLD AUTO: 0.4 % — SIGNIFICANT CHANGE UP (ref 0–2)
BILIRUB SERPL-MCNC: 0.3 MG/DL — SIGNIFICANT CHANGE UP (ref 0.2–1.2)
BUN SERPL-MCNC: 11 MG/DL — SIGNIFICANT CHANGE UP (ref 7–23)
CALCIUM SERPL-MCNC: 9.5 MG/DL — SIGNIFICANT CHANGE UP (ref 8.5–10.1)
CHLORIDE SERPL-SCNC: 108 MMOL/L — SIGNIFICANT CHANGE UP (ref 96–108)
CO2 SERPL-SCNC: 25 MMOL/L — SIGNIFICANT CHANGE UP (ref 22–31)
CREAT SERPL-MCNC: 0.75 MG/DL — SIGNIFICANT CHANGE UP (ref 0.5–1.3)
EOSINOPHIL # BLD AUTO: 0.29 K/UL — SIGNIFICANT CHANGE UP (ref 0–0.5)
EOSINOPHIL NFR BLD AUTO: 3.5 % — SIGNIFICANT CHANGE UP (ref 0–6)
GLUCOSE SERPL-MCNC: 109 MG/DL — HIGH (ref 70–99)
HCT VFR BLD CALC: 42.7 % — SIGNIFICANT CHANGE UP (ref 34.5–45)
HGB BLD-MCNC: 14.1 G/DL — SIGNIFICANT CHANGE UP (ref 11.5–15.5)
IMM GRANULOCYTES NFR BLD AUTO: 0.1 % — SIGNIFICANT CHANGE UP (ref 0–1.5)
LYMPHOCYTES # BLD AUTO: 3.02 K/UL — SIGNIFICANT CHANGE UP (ref 1–3.3)
LYMPHOCYTES # BLD AUTO: 36.3 % — SIGNIFICANT CHANGE UP (ref 13–44)
MAGNESIUM SERPL-MCNC: 2.3 MG/DL — SIGNIFICANT CHANGE UP (ref 1.6–2.6)
MCHC RBC-ENTMCNC: 31.4 PG — SIGNIFICANT CHANGE UP (ref 27–34)
MCHC RBC-ENTMCNC: 33 GM/DL — SIGNIFICANT CHANGE UP (ref 32–36)
MCV RBC AUTO: 95.1 FL — SIGNIFICANT CHANGE UP (ref 80–100)
MONOCYTES # BLD AUTO: 0.52 K/UL — SIGNIFICANT CHANGE UP (ref 0–0.9)
MONOCYTES NFR BLD AUTO: 6.3 % — SIGNIFICANT CHANGE UP (ref 2–14)
NEUTROPHILS # BLD AUTO: 4.44 K/UL — SIGNIFICANT CHANGE UP (ref 1.8–7.4)
NEUTROPHILS NFR BLD AUTO: 53.4 % — SIGNIFICANT CHANGE UP (ref 43–77)
PLATELET # BLD AUTO: 253 K/UL — SIGNIFICANT CHANGE UP (ref 150–400)
POTASSIUM SERPL-MCNC: 5.1 MMOL/L — SIGNIFICANT CHANGE UP (ref 3.5–5.3)
POTASSIUM SERPL-SCNC: 5.1 MMOL/L — SIGNIFICANT CHANGE UP (ref 3.5–5.3)
PROT SERPL-MCNC: 8 GM/DL — SIGNIFICANT CHANGE UP (ref 6–8.3)
RBC # BLD: 4.49 M/UL — SIGNIFICANT CHANGE UP (ref 3.8–5.2)
RBC # FLD: 12.8 % — SIGNIFICANT CHANGE UP (ref 10.3–14.5)
SODIUM SERPL-SCNC: 137 MMOL/L — SIGNIFICANT CHANGE UP (ref 135–145)
TROPONIN I SERPL-MCNC: <0.015 NG/ML — SIGNIFICANT CHANGE UP (ref 0.01–0.04)
WBC # BLD: 8.31 K/UL — SIGNIFICANT CHANGE UP (ref 3.8–10.5)
WBC # FLD AUTO: 8.31 K/UL — SIGNIFICANT CHANGE UP (ref 3.8–10.5)

## 2021-05-30 PROCEDURE — 85025 COMPLETE CBC W/AUTO DIFF WBC: CPT

## 2021-05-30 PROCEDURE — 80053 COMPREHEN METABOLIC PANEL: CPT

## 2021-05-30 PROCEDURE — 36415 COLL VENOUS BLD VENIPUNCTURE: CPT

## 2021-05-30 PROCEDURE — 83735 ASSAY OF MAGNESIUM: CPT

## 2021-05-30 PROCEDURE — 99283 EMERGENCY DEPT VISIT LOW MDM: CPT | Mod: 25

## 2021-05-30 PROCEDURE — 93005 ELECTROCARDIOGRAM TRACING: CPT

## 2021-05-30 PROCEDURE — 99285 EMERGENCY DEPT VISIT HI MDM: CPT

## 2021-05-30 PROCEDURE — 84484 ASSAY OF TROPONIN QUANT: CPT

## 2021-05-30 PROCEDURE — 93010 ELECTROCARDIOGRAM REPORT: CPT

## 2021-05-30 PROCEDURE — 71045 X-RAY EXAM CHEST 1 VIEW: CPT

## 2021-05-30 PROCEDURE — 71045 X-RAY EXAM CHEST 1 VIEW: CPT | Mod: 26

## 2021-05-30 RX ORDER — SODIUM CHLORIDE 9 MG/ML
1000 INJECTION INTRAMUSCULAR; INTRAVENOUS; SUBCUTANEOUS ONCE
Refills: 0 | Status: COMPLETED | OUTPATIENT
Start: 2021-05-30 | End: 2021-05-30

## 2021-05-30 RX ADMIN — SODIUM CHLORIDE 1000 MILLILITER(S): 9 INJECTION INTRAMUSCULAR; INTRAVENOUS; SUBCUTANEOUS at 19:06

## 2021-05-30 NOTE — ED STATDOCS - NS ED ROS FT
Constitutional: No fever or chills  Eyes: No visual changes  HEENT: No throat pain  CV: No chest pain + palpitations  Resp: No SOB no cough  GI: No abd pain, nausea or vomiting  : No dysuria  MSK: No musculoskeletal pain  Skin: No rash  Neuro: No headache

## 2021-05-30 NOTE — ED STATDOCS - CLINICAL SUMMARY MEDICAL DECISION MAKING FREE TEXT BOX
60F hx hypothyroid hld SVT presents to the ED for palpitations. Pt was doing crafts when she felt palpitations. Took her hr and it was in 130s. lasted for about 2 hours. was bearing down and it broke. now asymptomatic. pt states this has happened before was SVT on metoprolol follows with cardiology Dr. Cox. States 2 days ago increased her synthroid. no cp sob leg swelling.

## 2021-05-30 NOTE — ED STATDOCS - PENDING LAB RAD OPT OUT
Patient information on fall and injury prevention Exclude Pending Lab and Radiology orders from printing on the Patient's Discharge Instructions, due to Privacy Concerns.

## 2021-05-30 NOTE — ED STATDOCS - NSFOLLOWUPINSTRUCTIONS_ED_ALL_ED_FT
1. return for worsening symptoms or anything concerning to you  2. take all home meds as prescribed  3. follow up with your pmd call to make an appointment  4. follow up with your cardiologist this week    Heart Palpitations    WHAT YOU NEED TO KNOW:    Heart palpitations are feelings that your heart races, jumps, throbs, or flutters. You may feel extra beats, no beats for a short time, or skipped beats. You may have these feelings in your chest, throat, or neck. They may happen when you are sitting, standing, or lying. Heart palpitations may be frightening, but are usually not caused by a serious problem.     DISCHARGE INSTRUCTIONS:    Call 911 or have someone else call for any of the following:     You have any of the following signs of a heart attack:   Squeezing, pressure, or pain in your chest       and any of the following:   Discomfort or pain in your back, neck, jaw, stomach, or arm       Shortness of breath      Nausea or vomiting      Lightheadedness or a sudden cold sweat      You have any of the following signs of a stroke:   Numbness or drooping on one side of your face       Weakness in an arm or leg      Confusion or difficulty speaking      Dizziness, a severe headache, or vision loss      You faint or lose consciousness.     Return to the emergency department if:     Your palpitations happen more often or get more intense.         Contact your healthcare provider if:     You have new or worsening swelling in your feet or ankles.      You have questions or concerns about your condition or care.    Follow up with your healthcare provider as directed: You may need to follow up with a cardiologist. You may need tests to check for heart problems that cause palpitations. Write down your questions so you remember to ask them during your visits.     Keep a record: Write down when your palpitations start and stop, what you were doing when they started, and your symptoms. Keep track of what you ate or drank within a few hours of your palpitations. Include anything that seemed to help your symptoms, such as lying down or holding your breath. This record will help you and your healthcare provider learn what triggers your palpitations. Bring this record with you to your follow up visits.    Help prevent heart palpitations:     Manage stress and anxiety. Find ways to relax such as listening to music, meditating, or doing yoga. Exercise can also help decrease stress and anxiety. Talk to someone you trust about your stress or anxiety. You can also talk to a therapist.       Get plenty of sleep every night. Ask your healthcare provider how much sleep you need each night.       Do not drink caffeine or alcohol. Caffeine and alcohol can make your palpitations worse. Caffeine is found in soda, coffee, tea, chocolate, and drinks that increase your energy.       Do not smoke. Nicotine and other chemicals in cigarettes and cigars may damage your heart and blood vessels. Ask your healthcare provider for information if you currently smoke and need help to quit. E-cigarettes or smokeless tobacco still contain nicotine. Talk to your healthcare provider before you use these products.       Do not use illegal drugs. Talk to your healthcare provider if you use illegal drugs and want help to quit.

## 2021-05-30 NOTE — ED ADULT TRIAGE NOTE - WEIGHT IN KG
Tomas Liu MEDICATION AND   SIDE EFFECT GUIDE    The Jessica Little MEDICATION AND SIDE EFFECT GUIDE was provided to the PATIENT AND CARE PROVIDER. Information provided includes instruction about drug purpose and common side effects for the following medications:   Percocet, robaxin and anti nausea med  Scope patch----------------------------------------------Breast Augmentation Patient Instructions    Please come to the office tomorrow at 10:30 am for a follow up visit and bandage change before the holiday weekend. Immediately Following Surgery:    After surgery you will rest quietly for the first 48 hours. You will be able to walk around the house and perform light daily activities; however, during this time, it is not at all unusual for you to feel some pain, soreness and pressure in the chest area. This will gradually subside and Dr. Jovany Palencia will give you pain medication to relieve it. You must take the entire prescription of antibiotics. Tomas Liu MEDICATION AND   SIDE EFFECT GUIDE    The Jessica Little MEDICATION AND SIDE EFFECT GUIDE was provided to the PATIENT AND CARE PROVIDER. Information provided includes instruction about drug purpose and common side effects for the following medications:   · Percocet , Robaxin and ? Nausea medication ( patient did not know the name) all Rxs given prior to today from MD      Be sure to lie on your back whenever you rest or sleep. Keep your head elevated for 72 hours after surgery as this will help with swelling. The surgery bra that you have been put in should be worn constantly during the day and at night. Dr. Jovany Palencia will see you in the office the day after surgery to check your progress. You will then be allowed to shower two days after surgery and change the bra as needed. When taking a shower, remove the bra and take off the gauze. The small white tapes that are under the gauze directly over your incision should be left on.   Wash yourself everywhere, including the tapes and your incisions. Use mild soap and pat yourself dry and put the bra back on. You dont need to cover the small white tapes over your incision. This daily routine will help keep the incisions clean, and will promote wound healing. Do not submerge yourself in a bath, swimming pool, or whirlpool for two weeks. You will wear the sports bra for a total of two to three weeks after surgery. The small tape strips covering your incisions. These will remain in place for seven days and will peel off by themselves. A few patients react to the anesthetic after surgery with nausea and vomiting. This usually lasts less than 24 hours and should be treated with lots of fluids. Dr. Julio Arroyo will prescribe nausea medicine for during your preoperative visit. The maximum swelling occurs at about three days and then begins to dramatically improve. Mild bruising typically resolves within 14 days. You should plan to be off work for up to five to seven days, although this can vary from person to person. Additional Post-Operative Instructions:    No heavy exercise or lifting for three weeks following surgery. This will allow the implants to remain in the proper position without movement. For the first three days following surgery you should try to restrict your arm movements. Move your arms slowly and avoid sudden jerky movements of the chest and breast area. Keep your arms as close to your body as possible. This allows the implants to remain in place. Dr. Julio Arroyo encourages walking immediately after surgery. This activity will greatly minimize the risk of blood clots in your leg veins. Do not expose your breasts to the sun for eight weeks after surgery.    Please notify Dr. Julio Arroyo if:   If your one breast becomes significantly larger than the other   If you develop significant bruising across the chest    If you experience a significant increase in pain in the breast after the pain has improved   If you develop a temperature above 101.5° F   If you develop redness (like a sunburn) around your incisions  If you need help or have any questions feel free to call Dr Juan Machado with your concerns. Dr. Juan Machado is on call 24 hours per day, seven days per week and has an answering service to forward calls. Breast Massage Following Breast Augmentation   You will be taught how to perform the breast massage exercises during your post operative visits. The purpose of these exercises is to keep the scar tissue that forms around implants as soft as possible. By performing these exercises as described, you can help soften the internal scar, minimize the risk of significant capsular contracture and maximize the likelihood of a soft, natural feel and appearance to your breast.    Begin doing the exercises two weeks after surgery. Dr. Juan Machado will show you the technique during your second post-operative visit. It is important to remember that slow steady massage is more effective than quick jerky movements. Don't worry about injuring the implant; you cannot cause a rupture with these exercises.  Press the breasts slowly and maximally inwards (towards your breast bone) and hold for 10 seconds, then release. Repeat four times.  Press the breasts apart slowly and maximally outwards and hold for 10 seconds, then release. Repeat four times.  Repeat for downward movement.  Repeat for upward movement.  Perform these exercises four times per day for the first three months. The quality of your cosmetic enhancement may be compromised if you fail to return for any scheduled post-op visits, or follow the pre- and post-operative instructions. Please dont hesitate to report any unusual or concerning changes. Our number is [phone]. [phone]

## 2021-05-30 NOTE — ED ADULT TRIAGE NOTE - CHIEF COMPLAINT QUOTE
pt presents to ED c/o SVT. pt states hx of SVT in past requiring adenosine, felt the same way today beginning 3 hours ago while at home. pt reports she attempted bearing down/other valsalva maneuvers without relief. upon arrival to ED pt states symptoms have not resolved though SPO2 monitor at triage shows HR 91-94 with SPO2 94-95%. pt sent directly for EKG.

## 2021-05-30 NOTE — ED STATDOCS - PATIENT PORTAL LINK FT
You can access the FollowMyHealth Patient Portal offered by [facility] by registering at the following website: [email or web address]. By joining [employer]’s FollowMyHealth portal, you will also be able to view your health information using other applications (apps) compatible with our system.

## 2021-05-30 NOTE — ED STATDOCS - PROGRESS NOTE DETAILS
trop negative labs unremarkable. no tele events. pt wants to go home. has follow up with her cardiologist in 2 days. will d/c with follow up and strict return precautions. Raimundo Holbrook M.D., Attending Physician

## 2021-06-02 ENCOUNTER — APPOINTMENT (OUTPATIENT)
Dept: CARDIOLOGY | Facility: CLINIC | Age: 61
End: 2021-06-02
Payer: COMMERCIAL

## 2021-06-02 ENCOUNTER — NON-APPOINTMENT (OUTPATIENT)
Age: 61
End: 2021-06-02

## 2021-06-02 VITALS
BODY MASS INDEX: 29.23 KG/M2 | SYSTOLIC BLOOD PRESSURE: 106 MMHG | DIASTOLIC BLOOD PRESSURE: 74 MMHG | HEART RATE: 66 BPM | HEIGHT: 63 IN | WEIGHT: 165 LBS | OXYGEN SATURATION: 95 %

## 2021-06-02 PROCEDURE — 99213 OFFICE O/P EST LOW 20 MIN: CPT

## 2021-06-02 PROCEDURE — 93000 ELECTROCARDIOGRAM COMPLETE: CPT

## 2021-06-02 PROCEDURE — 99072 ADDL SUPL MATRL&STAF TM PHE: CPT

## 2021-06-03 NOTE — DISCUSSION/SUMMARY
[FreeTextEntry1] : Emely is a 61 y/o woman HTN ,Hchol, paroxysmal A-tach; recent admission for episode of SVT here for F/u\par \par Increase metoprolol to 100 daily \par Couldnt tolerate fishoil - will start low dose tricor for elev TRG\par \par F/u in Oct\par \par \par \par

## 2021-06-03 NOTE — HISTORY OF PRESENT ILLNESS
[FreeTextEntry1] : Emely is returning for a follow-up.\par \par Episode of SVT requiring hospital visit again ([facility])\par \par No chest pains or tightness\par No syncope \par Has felt well since

## 2021-06-03 NOTE — CARDIOLOGY SUMMARY
[de-identified] : 6/2/21\par Sinus  Rhythm \par Low voltage in precordial leads. \par  -  Negative T-waves  -Possible  Anterior  ischemia. \par \par ABNORMAL

## 2021-07-08 ENCOUNTER — TRANSCRIPTION ENCOUNTER (OUTPATIENT)
Age: 61
End: 2021-07-08

## 2021-07-13 ENCOUNTER — APPOINTMENT (OUTPATIENT)
Dept: FAMILY MEDICINE | Facility: CLINIC | Age: 61
End: 2021-07-13
Payer: COMMERCIAL

## 2021-07-13 VITALS
BODY MASS INDEX: 29.41 KG/M2 | SYSTOLIC BLOOD PRESSURE: 112 MMHG | WEIGHT: 166 LBS | TEMPERATURE: 97.9 F | HEART RATE: 73 BPM | DIASTOLIC BLOOD PRESSURE: 74 MMHG | HEIGHT: 63 IN | OXYGEN SATURATION: 97 %

## 2021-07-13 DIAGNOSIS — R05 COUGH: ICD-10-CM

## 2021-07-13 DIAGNOSIS — Z01.818 ENCOUNTER FOR OTHER PREPROCEDURAL EXAMINATION: ICD-10-CM

## 2021-07-13 DIAGNOSIS — R42 DIZZINESS AND GIDDINESS: ICD-10-CM

## 2021-07-13 DIAGNOSIS — E06.3 AUTOIMMUNE THYROIDITIS: ICD-10-CM

## 2021-07-13 DIAGNOSIS — J06.9 ACUTE UPPER RESPIRATORY INFECTION, UNSPECIFIED: ICD-10-CM

## 2021-07-13 PROCEDURE — 99203 OFFICE O/P NEW LOW 30 MIN: CPT

## 2021-07-13 PROCEDURE — 99072 ADDL SUPL MATRL&STAF TM PHE: CPT

## 2021-07-13 NOTE — PHYSICAL EXAM
[PERRL] : pupils equal round and reactive to light [EOMI] : extraocular movements intact [Normal] : no respiratory distress, lungs were clear to auscultation bilaterally and no accessory muscle use [No Focal Deficits] : no focal deficits [de-identified] : No nystagmus [de-identified] : no dizziness with head turn.  [de-identified] : Negative Romberg, negative [place]-Hallpike.

## 2021-07-13 NOTE — HISTORY OF PRESENT ILLNESS
[FreeTextEntry8] : New patient visit for acute illness.\par Went to urgent care for cough. Tessalon Perles and cough medication not working so started Z-pack. She is concerned as she gets very dizzy when coughing. \par She stopped the Tessalon for past day. Still dizzy today when coughing. \par No nausea, feels room spinning. \par \par Up to date with specialists. \par Cardiology , on medication for hyperlipidemia, HTN\par Hashimoto's, on Synthroid.\par GERD, protonix\par

## 2021-08-10 ENCOUNTER — RX RENEWAL (OUTPATIENT)
Age: 61
End: 2021-08-10

## 2021-12-01 ENCOUNTER — TRANSCRIPTION ENCOUNTER (OUTPATIENT)
Age: 61
End: 2021-12-01

## 2022-01-07 LAB
ALBUMIN SERPL ELPH-MCNC: 5.1 G/DL
ALP BLD-CCNC: 83 U/L
ALT SERPL-CCNC: 53 U/L
ANION GAP SERPL CALC-SCNC: 14 MMOL/L
AST SERPL-CCNC: 30 U/L
BILIRUB SERPL-MCNC: 0.5 MG/DL
BUN SERPL-MCNC: 14 MG/DL
CALCIUM SERPL-MCNC: 10.4 MG/DL
CHLORIDE SERPL-SCNC: 100 MMOL/L
CHOLEST SERPL-MCNC: 211 MG/DL
CO2 SERPL-SCNC: 26 MMOL/L
CREAT SERPL-MCNC: 0.73 MG/DL
GLUCOSE SERPL-MCNC: 102 MG/DL
HDLC SERPL-MCNC: 54 MG/DL
LDLC SERPL CALC-MCNC: 104 MG/DL
NONHDLC SERPL-MCNC: 157 MG/DL
POTASSIUM SERPL-SCNC: 4.9 MMOL/L
PROT SERPL-MCNC: 7.8 G/DL
SODIUM SERPL-SCNC: 140 MMOL/L
TRIGL SERPL-MCNC: 261 MG/DL

## 2022-01-13 ENCOUNTER — NON-APPOINTMENT (OUTPATIENT)
Age: 62
End: 2022-01-13

## 2022-01-13 ENCOUNTER — APPOINTMENT (OUTPATIENT)
Dept: CARDIOLOGY | Facility: CLINIC | Age: 62
End: 2022-01-13
Payer: COMMERCIAL

## 2022-01-13 VITALS — OXYGEN SATURATION: 99 % | SYSTOLIC BLOOD PRESSURE: 113 MMHG | HEART RATE: 67 BPM | DIASTOLIC BLOOD PRESSURE: 76 MMHG

## 2022-01-13 PROCEDURE — 93000 ELECTROCARDIOGRAM COMPLETE: CPT

## 2022-01-13 PROCEDURE — 99213 OFFICE O/P EST LOW 20 MIN: CPT

## 2022-01-18 NOTE — DISCUSSION/SUMMARY
[FreeTextEntry1] : Emely is a 62 y/o woman HTN ,Hchol, paroxysmal A-tach; recent admission for episode of SVT here for F/u\par \par Start Fibrae and recheck lipids in 2 mo\par \par F/u in April\par \par \par \par

## 2022-01-18 NOTE — HISTORY OF PRESENT ILLNESS
[FreeTextEntry1] : Emely is returning for a follow-up.\par \par \par No chest pains or tightness\par No syncope \par Has felt well since last visit\par Hasnt started Fibrate - but rechecked her trg which remain high

## 2022-01-18 NOTE — CARDIOLOGY SUMMARY
[de-identified] : 1/13/22\par Sinus  Rhythm \par -  T-abnormality  - Anteroseptal ischemia. \par \par ABNORMAL \par

## 2022-03-23 LAB
ALBUMIN SERPL ELPH-MCNC: 5.1 G/DL
ALP BLD-CCNC: 65 U/L
ALT SERPL-CCNC: 30 U/L
ANION GAP SERPL CALC-SCNC: 14 MMOL/L
AST SERPL-CCNC: 29 U/L
BILIRUB SERPL-MCNC: 0.4 MG/DL
BUN SERPL-MCNC: 9 MG/DL
CALCIUM SERPL-MCNC: 10.2 MG/DL
CHLORIDE SERPL-SCNC: 102 MMOL/L
CHOLEST SERPL-MCNC: 200 MG/DL
CO2 SERPL-SCNC: 22 MMOL/L
CREAT SERPL-MCNC: 0.72 MG/DL
EGFR: 94 ML/MIN/1.73M2
GLUCOSE SERPL-MCNC: 98 MG/DL
HDLC SERPL-MCNC: 55 MG/DL
LDLC SERPL CALC-MCNC: 116 MG/DL
NONHDLC SERPL-MCNC: 145 MG/DL
POTASSIUM SERPL-SCNC: 4.5 MMOL/L
PROT SERPL-MCNC: 7.3 G/DL
SODIUM SERPL-SCNC: 139 MMOL/L
TRIGL SERPL-MCNC: 147 MG/DL

## 2022-04-19 ENCOUNTER — APPOINTMENT (OUTPATIENT)
Dept: ELECTROPHYSIOLOGY | Facility: CLINIC | Age: 62
End: 2022-04-19
Payer: COMMERCIAL

## 2022-04-26 ENCOUNTER — FORM ENCOUNTER (OUTPATIENT)
Age: 62
End: 2022-04-26

## 2022-05-10 ENCOUNTER — RX RENEWAL (OUTPATIENT)
Age: 62
End: 2022-05-10

## 2022-05-19 PROCEDURE — 93248 EXT ECG>7D<15D REV&INTERPJ: CPT

## 2022-07-03 ENCOUNTER — NON-APPOINTMENT (OUTPATIENT)
Age: 62
End: 2022-07-03

## 2022-07-04 ENCOUNTER — TRANSCRIPTION ENCOUNTER (OUTPATIENT)
Age: 62
End: 2022-07-04

## 2022-07-07 ENCOUNTER — OUTPATIENT (OUTPATIENT)
Dept: OUTPATIENT SERVICES | Facility: HOSPITAL | Age: 62
LOS: 1 days | End: 2022-07-07

## 2022-07-07 ENCOUNTER — APPOINTMENT (OUTPATIENT)
Dept: DISASTER EMERGENCY | Facility: HOSPITAL | Age: 62
End: 2022-07-07

## 2022-07-07 VITALS
OXYGEN SATURATION: 94 % | TEMPERATURE: 99 F | SYSTOLIC BLOOD PRESSURE: 96 MMHG | HEART RATE: 65 BPM | DIASTOLIC BLOOD PRESSURE: 65 MMHG | RESPIRATION RATE: 15 BRPM

## 2022-07-07 VITALS
SYSTOLIC BLOOD PRESSURE: 90 MMHG | RESPIRATION RATE: 16 BRPM | HEIGHT: 62 IN | TEMPERATURE: 98 F | DIASTOLIC BLOOD PRESSURE: 60 MMHG | WEIGHT: 160.06 LBS | HEART RATE: 68 BPM | OXYGEN SATURATION: 97 %

## 2022-07-07 DIAGNOSIS — Z98.890 OTHER SPECIFIED POSTPROCEDURAL STATES: Chronic | ICD-10-CM

## 2022-07-07 DIAGNOSIS — U07.1 COVID-19: ICD-10-CM

## 2022-07-07 DIAGNOSIS — Z90.49 ACQUIRED ABSENCE OF OTHER SPECIFIED PARTS OF DIGESTIVE TRACT: Chronic | ICD-10-CM

## 2022-07-07 RX ORDER — SODIUM CHLORIDE 9 MG/ML
250 INJECTION INTRAMUSCULAR; INTRAVENOUS; SUBCUTANEOUS ONCE
Refills: 0 | Status: COMPLETED | OUTPATIENT
Start: 2022-07-07 | End: 2022-07-07

## 2022-07-07 RX ORDER — BEBTELOVIMAB 87.5 MG/ML
175 INJECTION, SOLUTION INTRAVENOUS ONCE
Refills: 0 | Status: COMPLETED | OUTPATIENT
Start: 2022-07-07 | End: 2022-07-07

## 2022-07-07 RX ADMIN — BEBTELOVIMAB 175 MILLIGRAM(S): 87.5 INJECTION, SOLUTION INTRAVENOUS at 12:15

## 2022-07-07 RX ADMIN — SODIUM CHLORIDE 500 MILLILITER(S): 9 INJECTION INTRAMUSCULAR; INTRAVENOUS; SUBCUTANEOUS at 12:15

## 2022-07-07 NOTE — CHART NOTE - NSCHARTNOTEFT_GEN_A_CORE
CC: Monoclonal Antibody Infusion/COVID 19 Positive  61y Female PMHx Hypothyroid, Anxiety, GERD, SVT, and recent dx of COVID 19+ who presents today for elective Bebtelovimab. Patient has been screened and was deemed to be a candidate.    Symptoms/ Criteria  Date of Symptom Onset: 7/3  Symptoms: Cough, Congestion, Sore Throat, Headache, Malaise, Muscle Aches, Nausea  Date of Positive COVID PCR: 7/4  Risk Profile includes:       Vaccination Status: Vaccinated with Pfizer    PMHx:  No pertinent family history in first degree relatives    Infection due to severe acute respiratory syndrome coronavirus 2 (SARS-CoV-2)    HLD (hyperlipidemia)    GERD (gastroesophageal reflux disease)    Anxiety    Depression    Hypothyroidism    HLD (hyperlipidemia)    GERD (gastroesophageal reflux disease)    Anxiety    Depression    Hypothyroidism    No significant past surgical history    History of appendectomy    H/O hemorrhoidectomy    H/O carpal tunnel repair        Exam/findings:  T(C): 36.6 (07-07-22 @ 12:05), Max: 36.6 (07-07-22 @ 12:05)  HR: 68 (07-07-22 @ 12:05) (68 - 68)  BP: 90/60 (07-07-22 @ 12:05) (90/60 - 90/60)  RR: 16 (07-07-22 @ 12:05) (16 - 16)  SpO2: 97% (07-07-22 @ 12:05) (97% - 97%)    PE:   Appearance: NAD	  HEENT:  NC/AT  Cardiovascular:  No edema  Respiratory: no use of accessory muscles  Gastrointestinal:  non-distended   Skin: warm and dry  Neurologic: Non-focal  Extremities: Normal range of motion    ASSESSMENT:  Pt is COVID positive with mild to moderate symptoms who was referred for elective MAB (Bebtelovimab). Patient reports feeling nauseated today, had small PB&J sandwich, and  a little soda. BP on arrival 90/60. Denies any dizziness, shortness of breath, chest pain , or palpitations, patient reports her baseline BP is on the lower side. Will give 250mL NaCl bolus.    PLAN:  - MAB treatment explained to patient. I have reviewed the Bebtelovimab Emergency Use Authorization (EUA) and I have provided the patient or patient's caregiver with the following information:   1. FDA has authorized emergency use of Bebtelovimab to be administered for the treatment of mild to moderate COVID-19, which is not an FDA-approved biological product.   2. The patient or patient's caregiver has the option to accept or refuse administration of MAB.   3. The significant known and potential risks and benefits of Bebtelovimab and the extent to which such risks and benefits are unknown.  4. Information on available alternative treatments and risks and benefits of those alternatives.  - Patient verbalized understanding of plan and agrees to treatment. All questions answered.  - Consent for MAB obtained.   - 175mg of Bebtelovimab administered as a single intravenous injection over at least 30 seconds.   - Observe patient for one hour post medication administration and then if stable, discharge home with oupatient follow up as planned by PCP.      POST ASSESSMENT:   Patient completed MAB, and monitored x 1 hour post-infusion with no adverse reactions noted, remained hemodynamically stable.  - Patient tolerated infusion well; denies complaints of chest pain/SOB/dizziness/palpitations.   - VSS for discharge home.  - D/C instructions given/ fact sheet included.  - Patient was instructed to self-isolate and use infection control measures (e.g wear mask, isolate, social distance, avoid sharing personal items, clean and disinfect "high touch" surfaces, and frequent handwashing according to the CDC guidelines.   - The patient was informed on what symptoms to be aware of for the next couple of days, and if there are any issues to call the 24/7 clinical call center. Patient was instructed to follow up with primary care provider as needed.    DISCHARGE 1 hour post IV Injection.

## 2022-07-14 ENCOUNTER — APPOINTMENT (OUTPATIENT)
Dept: CARDIOLOGY | Facility: CLINIC | Age: 62
End: 2022-07-14

## 2022-07-21 ENCOUNTER — NON-APPOINTMENT (OUTPATIENT)
Age: 62
End: 2022-07-21

## 2022-07-21 ENCOUNTER — APPOINTMENT (OUTPATIENT)
Dept: CARDIOLOGY | Facility: CLINIC | Age: 62
End: 2022-07-21

## 2022-07-21 VITALS
HEIGHT: 63 IN | BODY MASS INDEX: 28.7 KG/M2 | SYSTOLIC BLOOD PRESSURE: 115 MMHG | OXYGEN SATURATION: 95 % | HEART RATE: 68 BPM | DIASTOLIC BLOOD PRESSURE: 78 MMHG | WEIGHT: 162 LBS

## 2022-07-21 DIAGNOSIS — R06.09 OTHER FORMS OF DYSPNEA: ICD-10-CM

## 2022-07-21 DIAGNOSIS — E78.1 PURE HYPERGLYCERIDEMIA: ICD-10-CM

## 2022-07-21 LAB
ALBUMIN SERPL ELPH-MCNC: 4.6 G/DL
ALP BLD-CCNC: 65 U/L
ALT SERPL-CCNC: 30 U/L
ANION GAP SERPL CALC-SCNC: 10 MMOL/L
AST SERPL-CCNC: 24 U/L
BILIRUB SERPL-MCNC: 0.3 MG/DL
BUN SERPL-MCNC: 11 MG/DL
CALCIUM SERPL-MCNC: 9.5 MG/DL
CHLORIDE SERPL-SCNC: 109 MMOL/L
CHOLEST SERPL-MCNC: 157 MG/DL
CO2 SERPL-SCNC: 24 MMOL/L
CREAT SERPL-MCNC: 0.7 MG/DL
EGFR: 97 ML/MIN/1.73M2
GLUCOSE SERPL-MCNC: 122 MG/DL
HDLC SERPL-MCNC: 37 MG/DL
LDLC SERPL CALC-MCNC: 75 MG/DL
NONHDLC SERPL-MCNC: 120 MG/DL
POTASSIUM SERPL-SCNC: 4.1 MMOL/L
PROT SERPL-MCNC: 6.9 G/DL
SODIUM SERPL-SCNC: 143 MMOL/L
TRIGL SERPL-MCNC: 223 MG/DL

## 2022-07-21 PROCEDURE — 93000 ELECTROCARDIOGRAM COMPLETE: CPT

## 2022-07-21 PROCEDURE — 99214 OFFICE O/P EST MOD 30 MIN: CPT | Mod: 25

## 2022-07-25 PROBLEM — E78.1 PURE HYPERTRIGLYCERIDEMIA: Status: ACTIVE | Noted: 2021-03-03

## 2022-07-25 PROBLEM — R06.09 DYSPNEA ON EXERTION: Status: ACTIVE | Noted: 2020-01-05

## 2022-07-25 NOTE — DISCUSSION/SUMMARY
[EKG obtained to assist in diagnosis and management of assessed problem(s)] : EKG obtained to assist in diagnosis and management of assessed problem(s) [FreeTextEntry1] : Emely is a 61 y/o woman HTN ,Hchol, paroxysmal A-tach;  intermittent CP\par \par C/w current lipid rx\par Recurrent CP - Cardiac CT \par

## 2022-07-25 NOTE — HISTORY OF PRESENT ILLNESS
[FreeTextEntry1] : Emely is returning for a follow-up.\par \par Still feels intermittent chest pressure\par No LE edema\par No syncope  \par Labs reviewed\par

## 2022-07-25 NOTE — CARDIOLOGY SUMMARY
[de-identified] : 7/21/22\par Sinus  Rhythm \par Low voltage in precordial leads. \par  -  Negative T-waves  May be normal -consider acute process. \par \par ABNORMAL \par

## 2022-07-26 ENCOUNTER — OUTPATIENT (OUTPATIENT)
Dept: OUTPATIENT SERVICES | Facility: HOSPITAL | Age: 62
LOS: 1 days | End: 2022-07-26
Payer: COMMERCIAL

## 2022-07-26 ENCOUNTER — APPOINTMENT (OUTPATIENT)
Dept: CT IMAGING | Facility: CLINIC | Age: 62
End: 2022-07-26

## 2022-07-26 DIAGNOSIS — Z90.49 ACQUIRED ABSENCE OF OTHER SPECIFIED PARTS OF DIGESTIVE TRACT: Chronic | ICD-10-CM

## 2022-07-26 DIAGNOSIS — Z98.890 OTHER SPECIFIED POSTPROCEDURAL STATES: Chronic | ICD-10-CM

## 2022-07-26 DIAGNOSIS — E78.5 HYPERLIPIDEMIA, UNSPECIFIED: ICD-10-CM

## 2022-07-26 PROCEDURE — 75574 CT ANGIO HRT W/3D IMAGE: CPT

## 2022-07-26 PROCEDURE — 75574 CT ANGIO HRT W/3D IMAGE: CPT | Mod: 26,MH

## 2022-07-27 ENCOUNTER — NON-APPOINTMENT (OUTPATIENT)
Age: 62
End: 2022-07-27

## 2022-08-11 NOTE — PATIENT PROFILE ADULT. - FUNCTIONAL SCREEN CURRENT LEVEL: SWALLOWING (IF SCORE 2 OR MORE FOR ANY ITEM, CONSULT REHAB SERVICES), MLM)
(0) swallows foods/liquids without difficulty Bi-Rhombic Flap Text: The defect edges were debeveled with a #15 scalpel blade.  Given the location of the defect and the proximity to free margins a bi-rhombic flap was deemed most appropriate.  Using a sterile surgical marker, an appropriate rhombic flap was drawn incorporating the defect. The area thus outlined was incised deep to adipose tissue with a #15 scalpel blade.  The skin margins were undermined to an appropriate distance in all directions utilizing iris scissors.

## 2022-09-03 ENCOUNTER — NON-APPOINTMENT (OUTPATIENT)
Age: 62
End: 2022-09-03

## 2022-11-07 ENCOUNTER — RX RENEWAL (OUTPATIENT)
Age: 62
End: 2022-11-07

## 2023-01-08 ENCOUNTER — NON-APPOINTMENT (OUTPATIENT)
Age: 63
End: 2023-01-08

## 2023-02-28 ENCOUNTER — RX RENEWAL (OUTPATIENT)
Age: 63
End: 2023-02-28

## 2023-03-20 NOTE — ASU PREOP CHECKLIST - AS BP NONINV SITE
No new care gaps identified.  [facility] Embedded Care Gaps. Reference number: [id number]. 3/20/2023   3:46:40 PM CDT   left upper arm

## 2023-04-05 ENCOUNTER — APPOINTMENT (OUTPATIENT)
Dept: CARDIOLOGY | Facility: CLINIC | Age: 63
End: 2023-04-05
Payer: MEDICARE

## 2023-04-05 VITALS
HEART RATE: 71 BPM | HEIGHT: 63 IN | OXYGEN SATURATION: 96 % | BODY MASS INDEX: 28.7 KG/M2 | DIASTOLIC BLOOD PRESSURE: 79 MMHG | WEIGHT: 162 LBS | SYSTOLIC BLOOD PRESSURE: 116 MMHG

## 2023-04-05 DIAGNOSIS — R94.31 ABNORMAL ELECTROCARDIOGRAM [ECG] [EKG]: ICD-10-CM

## 2023-04-05 PROCEDURE — 99214 OFFICE O/P EST MOD 30 MIN: CPT

## 2023-04-05 PROCEDURE — 93000 ELECTROCARDIOGRAM COMPLETE: CPT

## 2023-04-06 ENCOUNTER — NON-APPOINTMENT (OUTPATIENT)
Age: 63
End: 2023-04-06

## 2023-04-06 NOTE — DISCUSSION/SUMMARY
[FreeTextEntry1] : Emely is a 63 y/o woman HTN ,Hchol, paroxysmal  A-tach;  \par \par C/w current lipid rx \par Palps under control\par REpeat labs\par  [EKG obtained to assist in diagnosis and management of assessed problem(s)] : EKG obtained to assist in diagnosis and management of assessed problem(s)

## 2023-04-06 NOTE — CARDIOLOGY SUMMARY
[de-identified] : 4/5/23\par Sinus  Rhythm \par Low voltage in precordial leads. \par  -  Negative T-waves  -May be normal -possible  Anteroseptal/anterior ischemia. \par \par ABNORMAL \par

## 2023-04-06 NOTE — HISTORY OF PRESENT ILLNESS
[FreeTextEntry1] : Emely is returning for a follow-up.\par Feels well\par No CP\par No LE edema\par No syncope  \par Labs reviewed\par

## 2023-04-06 NOTE — PHYSICAL EXAM
[Well Developed] : well developed [Well Nourished] : well nourished [No Acute Distress] : no acute distress [Normal Venous Pressure] : normal venous pressure [Normal Conjunctiva] : normal conjunctiva [No Carotid Bruit] : no carotid bruit [Normal S1, S2] : normal S1, S2 [No Murmur] : no murmur [No Gallop] : no gallop [No Rub] : no rub [Clear Lung Fields] : clear lung fields [Good Air Entry] : good air entry [No Respiratory Distress] : no respiratory distress  [Soft] : abdomen soft [Non Tender] : non-tender [No Masses/organomegaly] : no masses/organomegaly [Normal Bowel Sounds] : normal bowel sounds [Normal Gait] : normal gait [No Edema] : no edema [No Cyanosis] : no cyanosis [No Varicosities] : no varicosities [No Clubbing] : no clubbing [No Rash] : no rash [No Skin Lesions] : no skin lesions [Moves all extremities] : moves all extremities [No Focal Deficits] : no focal deficits [Alert and Oriented] : alert and oriented [Normal Speech] : normal speech [Normal memory] : normal memory

## 2023-04-20 NOTE — ED ADULT NURSE NOTE - OBJECTIVE STATEMENT
Patient presents to the ED for palpitations. Pt was doing crafts when she felt palpitations. Took her hr and it was in 130s. lasted for about 2 hours. was bearing down and it broke. now asymptomatic. pt states this has happened before was SVT on metoprolol follows with cardiology Dr. Cox. States 2 days ago increased her synthroid. Denies chest pain, SOB appears normal and intact

## 2023-05-11 NOTE — DISCHARGE NOTE ADULT - NURSING SECTION COMPLETE
[facility] and [address] at [facility] at [address] [place] [address], [address] [facility] [address]  [phone] Office  [phone] Facsimile           Referring: Dr. Rafa Shetty    Chief Complaint   Patient presents with    Shortness of Breath    Palpitations     We are asked to see this 69-year-old lady in neurologic consultation regarding weakness and dysphagia. The case was discussed with speech therapist who noted a supratentorial type reaction to bedside swallowing eval. the patient is seen with her mother at the bedside. Both provide history. For about the last 4 weeks she says that she has had severe muscle weakness. She notes that she has difficulty holding up her head. She has been choking when she swallows. Nothing focal that is generalized. Never had anything like this before. Denies anything changing 3 to 4 weeks ago. No family history of anything like this and no neurologic issues in the family. Per ED physician's note, she patient presented to the emergency department complaining of shortness of breath and palpitations and her primary physician wanted her to be evaluated for potential PE. Her examination found her to be tachypneic.   Evaluation has included  CTA of the chest that was unremarkable  Dry head CT personally reviewed and unremarkable  MRA of the head unremarkable  MRA of the neck unremarkable  MRI of the brain unremarkable  Films pesonaly reviewed  Hemoglobin A1c 5.4  CBC unremarkable  CMP with elevated liver enzymes  Lipase normal  hCG negative  Urine toxicology positive for amphetamine  MBS said to show mild to moderate pharyngeal dysphagia    Past Medical History:   Diagnosis Date    ADHD     Asthma     Bleeding nose     Major depressive disorder     Thyroid disease        Past Surgical History:   Procedure Laterality Date    TONSILLECTOMY
Patient/Caregiver provided printed discharge information.

## 2023-06-19 LAB
ALBUMIN SERPL ELPH-MCNC: 4.6 G/DL
ALP BLD-CCNC: 59 U/L
ALT SERPL-CCNC: 38 U/L
ANION GAP SERPL CALC-SCNC: 10 MMOL/L
AST SERPL-CCNC: 31 U/L
BILIRUB SERPL-MCNC: 0.6 MG/DL
BUN SERPL-MCNC: 11 MG/DL
CALCIUM SERPL-MCNC: 9.8 MG/DL
CHLORIDE SERPL-SCNC: 104 MMOL/L
CHOLEST SERPL-MCNC: 147 MG/DL
CO2 SERPL-SCNC: 24 MMOL/L
CREAT SERPL-MCNC: 0.79 MG/DL
EGFR: 84 ML/MIN/1.73M2
ESTIMATED AVERAGE GLUCOSE: 120 MG/DL
GLUCOSE SERPL-MCNC: 100 MG/DL
HBA1C MFR BLD HPLC: 5.8 %
HDLC SERPL-MCNC: 44 MG/DL
LDLC SERPL CALC-MCNC: 80 MG/DL
NONHDLC SERPL-MCNC: 103 MG/DL
POTASSIUM SERPL-SCNC: 4.2 MMOL/L
PROT SERPL-MCNC: 6.8 G/DL
SODIUM SERPL-SCNC: 139 MMOL/L
TRIGL SERPL-MCNC: 118 MG/DL
TSH SERPL-ACNC: 0.4 UIU/ML

## 2023-08-31 ENCOUNTER — TRANSCRIPTION ENCOUNTER (OUTPATIENT)
Age: 63
End: 2023-08-31

## 2023-09-10 NOTE — CARDIOLOGY SUMMARY
[de-identified] : Anjel AT 4/27-5/7/2022 symptoms correlate to brief PAT  [de-identified] : 7/27/2022 1.  Less than 20% narrowing in the ostium of the LAD from mixed plaque. No obstructive coronary disease - - -

## 2023-09-12 ENCOUNTER — APPOINTMENT (OUTPATIENT)
Dept: ELECTROPHYSIOLOGY | Facility: CLINIC | Age: 63
End: 2023-09-12
Payer: MEDICARE

## 2023-09-12 VITALS
OXYGEN SATURATION: 97 % | SYSTOLIC BLOOD PRESSURE: 113 MMHG | WEIGHT: 160 LBS | HEIGHT: 63 IN | HEART RATE: 68 BPM | BODY MASS INDEX: 28.35 KG/M2 | DIASTOLIC BLOOD PRESSURE: 77 MMHG

## 2023-09-12 PROCEDURE — 93000 ELECTROCARDIOGRAM COMPLETE: CPT

## 2023-09-12 PROCEDURE — 99205 OFFICE O/P NEW HI 60 MIN: CPT

## 2023-09-12 RX ORDER — OMEGA-3-ACID ETHYL ESTERS CAPSULES 1 G/1
1 CAPSULE, LIQUID FILLED ORAL DAILY
Qty: 120 | Refills: 3 | Status: DISCONTINUED | COMMUNITY
Start: 2021-03-03 | End: 2023-09-12

## 2023-09-12 RX ORDER — DULAGLUTIDE 1.5 MG/.5ML
1.5 INJECTION, SOLUTION SUBCUTANEOUS
Qty: 6 | Refills: 0 | Status: DISCONTINUED | COMMUNITY
Start: 2017-12-07 | End: 2023-09-12

## 2023-10-10 ENCOUNTER — APPOINTMENT (OUTPATIENT)
Dept: CARDIOLOGY | Facility: CLINIC | Age: 63
End: 2023-10-10
Payer: MEDICARE

## 2023-10-10 VITALS
DIASTOLIC BLOOD PRESSURE: 77 MMHG | HEIGHT: 63 IN | WEIGHT: 164 LBS | SYSTOLIC BLOOD PRESSURE: 113 MMHG | OXYGEN SATURATION: 97 % | HEART RATE: 66 BPM | BODY MASS INDEX: 29.06 KG/M2

## 2023-10-10 DIAGNOSIS — R73.03 PREDIABETES.: ICD-10-CM

## 2023-10-10 PROCEDURE — 93000 ELECTROCARDIOGRAM COMPLETE: CPT

## 2023-10-10 PROCEDURE — 99213 OFFICE O/P EST LOW 20 MIN: CPT

## 2023-11-14 ENCOUNTER — OUTPATIENT (OUTPATIENT)
Dept: OUTPATIENT SERVICES | Facility: HOSPITAL | Age: 63
LOS: 1 days | End: 2023-11-14
Payer: MEDICARE

## 2023-11-14 VITALS
HEIGHT: 63 IN | DIASTOLIC BLOOD PRESSURE: 86 MMHG | OXYGEN SATURATION: 99 % | TEMPERATURE: 98 F | WEIGHT: 169.98 LBS | SYSTOLIC BLOOD PRESSURE: 127 MMHG | HEART RATE: 52 BPM | RESPIRATION RATE: 18 BRPM

## 2023-11-14 DIAGNOSIS — Z90.49 ACQUIRED ABSENCE OF OTHER SPECIFIED PARTS OF DIGESTIVE TRACT: Chronic | ICD-10-CM

## 2023-11-14 DIAGNOSIS — Z98.890 OTHER SPECIFIED POSTPROCEDURAL STATES: Chronic | ICD-10-CM

## 2023-11-14 DIAGNOSIS — I47.11 INAPPROPRIATE SINUS TACHYCARDIA, SO STATED: ICD-10-CM

## 2023-11-14 DIAGNOSIS — Z01.818 ENCOUNTER FOR OTHER PREPROCEDURAL EXAMINATION: ICD-10-CM

## 2023-11-14 DIAGNOSIS — I47.10 SUPRAVENTRICULAR TACHYCARDIA, UNSPECIFIED: ICD-10-CM

## 2023-11-14 DIAGNOSIS — E11.9 TYPE 2 DIABETES MELLITUS WITHOUT COMPLICATIONS: ICD-10-CM

## 2023-11-14 LAB
ANION GAP SERPL CALC-SCNC: 12 MMOL/L — SIGNIFICANT CHANGE UP (ref 5–17)
ANION GAP SERPL CALC-SCNC: 12 MMOL/L — SIGNIFICANT CHANGE UP (ref 5–17)
BLD GP AB SCN SERPL QL: NEGATIVE — SIGNIFICANT CHANGE UP
BLD GP AB SCN SERPL QL: NEGATIVE — SIGNIFICANT CHANGE UP
BUN SERPL-MCNC: 14 MG/DL — SIGNIFICANT CHANGE UP (ref 7–23)
BUN SERPL-MCNC: 14 MG/DL — SIGNIFICANT CHANGE UP (ref 7–23)
CALCIUM SERPL-MCNC: 9.8 MG/DL — SIGNIFICANT CHANGE UP (ref 8.4–10.5)
CALCIUM SERPL-MCNC: 9.8 MG/DL — SIGNIFICANT CHANGE UP (ref 8.4–10.5)
CHLORIDE SERPL-SCNC: 100 MMOL/L — SIGNIFICANT CHANGE UP (ref 96–108)
CHLORIDE SERPL-SCNC: 100 MMOL/L — SIGNIFICANT CHANGE UP (ref 96–108)
CO2 SERPL-SCNC: 25 MMOL/L — SIGNIFICANT CHANGE UP (ref 22–31)
CO2 SERPL-SCNC: 25 MMOL/L — SIGNIFICANT CHANGE UP (ref 22–31)
CREAT SERPL-MCNC: 0.71 MG/DL — SIGNIFICANT CHANGE UP (ref 0.5–1.3)
CREAT SERPL-MCNC: 0.71 MG/DL — SIGNIFICANT CHANGE UP (ref 0.5–1.3)
EGFR: 95 ML/MIN/1.73M2 — SIGNIFICANT CHANGE UP
EGFR: 95 ML/MIN/1.73M2 — SIGNIFICANT CHANGE UP
GLUCOSE SERPL-MCNC: 115 MG/DL — HIGH (ref 70–99)
GLUCOSE SERPL-MCNC: 115 MG/DL — HIGH (ref 70–99)
HCT VFR BLD CALC: 39.1 % — SIGNIFICANT CHANGE UP (ref 34.5–45)
HCT VFR BLD CALC: 39.1 % — SIGNIFICANT CHANGE UP (ref 34.5–45)
HGB BLD-MCNC: 13.1 G/DL — SIGNIFICANT CHANGE UP (ref 11.5–15.5)
HGB BLD-MCNC: 13.1 G/DL — SIGNIFICANT CHANGE UP (ref 11.5–15.5)
MCHC RBC-ENTMCNC: 31.2 PG — SIGNIFICANT CHANGE UP (ref 27–34)
MCHC RBC-ENTMCNC: 31.2 PG — SIGNIFICANT CHANGE UP (ref 27–34)
MCHC RBC-ENTMCNC: 33.5 GM/DL — SIGNIFICANT CHANGE UP (ref 32–36)
MCHC RBC-ENTMCNC: 33.5 GM/DL — SIGNIFICANT CHANGE UP (ref 32–36)
MCV RBC AUTO: 93.1 FL — SIGNIFICANT CHANGE UP (ref 80–100)
MCV RBC AUTO: 93.1 FL — SIGNIFICANT CHANGE UP (ref 80–100)
NRBC # BLD: 0 /100 WBCS — SIGNIFICANT CHANGE UP (ref 0–0)
NRBC # BLD: 0 /100 WBCS — SIGNIFICANT CHANGE UP (ref 0–0)
PLATELET # BLD AUTO: 266 K/UL — SIGNIFICANT CHANGE UP (ref 150–400)
PLATELET # BLD AUTO: 266 K/UL — SIGNIFICANT CHANGE UP (ref 150–400)
POTASSIUM SERPL-MCNC: 4.2 MMOL/L — SIGNIFICANT CHANGE UP (ref 3.5–5.3)
POTASSIUM SERPL-MCNC: 4.2 MMOL/L — SIGNIFICANT CHANGE UP (ref 3.5–5.3)
POTASSIUM SERPL-SCNC: 4.2 MMOL/L — SIGNIFICANT CHANGE UP (ref 3.5–5.3)
POTASSIUM SERPL-SCNC: 4.2 MMOL/L — SIGNIFICANT CHANGE UP (ref 3.5–5.3)
RBC # BLD: 4.2 M/UL — SIGNIFICANT CHANGE UP (ref 3.8–5.2)
RBC # BLD: 4.2 M/UL — SIGNIFICANT CHANGE UP (ref 3.8–5.2)
RBC # FLD: 12.7 % — SIGNIFICANT CHANGE UP (ref 10.3–14.5)
RBC # FLD: 12.7 % — SIGNIFICANT CHANGE UP (ref 10.3–14.5)
RH IG SCN BLD-IMP: POSITIVE — SIGNIFICANT CHANGE UP
RH IG SCN BLD-IMP: POSITIVE — SIGNIFICANT CHANGE UP
SODIUM SERPL-SCNC: 137 MMOL/L — SIGNIFICANT CHANGE UP (ref 135–145)
SODIUM SERPL-SCNC: 137 MMOL/L — SIGNIFICANT CHANGE UP (ref 135–145)
WBC # BLD: 6.17 K/UL — SIGNIFICANT CHANGE UP (ref 3.8–10.5)
WBC # BLD: 6.17 K/UL — SIGNIFICANT CHANGE UP (ref 3.8–10.5)
WBC # FLD AUTO: 6.17 K/UL — SIGNIFICANT CHANGE UP (ref 3.8–10.5)
WBC # FLD AUTO: 6.17 K/UL — SIGNIFICANT CHANGE UP (ref 3.8–10.5)

## 2023-11-14 PROCEDURE — 80048 BASIC METABOLIC PNL TOTAL CA: CPT

## 2023-11-14 PROCEDURE — 86901 BLOOD TYPING SEROLOGIC RH(D): CPT

## 2023-11-14 PROCEDURE — G0463: CPT

## 2023-11-14 PROCEDURE — 85027 COMPLETE CBC AUTOMATED: CPT

## 2023-11-14 PROCEDURE — 86850 RBC ANTIBODY SCREEN: CPT

## 2023-11-14 PROCEDURE — 86900 BLOOD TYPING SEROLOGIC ABO: CPT

## 2023-11-14 RX ORDER — ALPRAZOLAM 0.25 MG
1 TABLET ORAL
Qty: 0 | Refills: 0 | DISCHARGE

## 2023-11-14 RX ORDER — HYDROXYZINE HCL 10 MG
2 TABLET ORAL
Refills: 0 | DISCHARGE

## 2023-11-14 RX ORDER — OMEGA-3 ACID ETHYL ESTERS 1 G
1 CAPSULE ORAL
Qty: 0 | Refills: 0 | DISCHARGE

## 2023-11-14 RX ORDER — LANOLIN ALCOHOL/MO/W.PET/CERES
1 CREAM (GRAM) TOPICAL
Qty: 0 | Refills: 0 | DISCHARGE

## 2023-11-14 RX ORDER — DESVENLAFAXINE 50 MG/1
1 TABLET, EXTENDED RELEASE ORAL
Refills: 0 | DISCHARGE

## 2023-11-14 RX ORDER — ATORVASTATIN CALCIUM 80 MG/1
1 TABLET, FILM COATED ORAL
Refills: 0 | DISCHARGE

## 2023-11-14 RX ORDER — EMPAGLIFLOZIN 10 MG/1
1 TABLET, FILM COATED ORAL
Refills: 0 | DISCHARGE

## 2023-11-14 RX ORDER — FENOFIBRATE,MICRONIZED 130 MG
1 CAPSULE ORAL
Refills: 0 | DISCHARGE

## 2023-11-14 RX ORDER — METOPROLOL TARTRATE 50 MG
0 TABLET ORAL
Qty: 0 | Refills: 0 | DISCHARGE

## 2023-11-14 RX ORDER — CYCLOBENZAPRINE HYDROCHLORIDE 10 MG/1
0 TABLET, FILM COATED ORAL
Qty: 0 | Refills: 0 | DISCHARGE

## 2023-11-14 RX ORDER — LATANOPROST 0.05 MG/ML
1 SOLUTION/ DROPS OPHTHALMIC; TOPICAL
Qty: 0 | Refills: 0 | DISCHARGE

## 2023-11-14 RX ORDER — METFORMIN HYDROCHLORIDE 850 MG/1
1 TABLET ORAL
Refills: 0 | DISCHARGE

## 2023-11-14 RX ORDER — ATORVASTATIN CALCIUM 80 MG/1
1 TABLET, FILM COATED ORAL
Qty: 0 | Refills: 0 | DISCHARGE

## 2023-11-14 RX ORDER — SEMAGLUTIDE 0.68 MG/ML
0 INJECTION, SOLUTION SUBCUTANEOUS
Qty: 0 | Refills: 0 | DISCHARGE

## 2023-11-14 RX ORDER — CLONAZEPAM 1 MG
1 TABLET ORAL
Refills: 0 | DISCHARGE

## 2023-11-14 RX ORDER — PANTOPRAZOLE SODIUM 20 MG/1
1 TABLET, DELAYED RELEASE ORAL
Qty: 0 | Refills: 0 | DISCHARGE

## 2023-11-14 RX ORDER — LEVOTHYROXINE SODIUM 125 MCG
1 TABLET ORAL
Qty: 0 | Refills: 0 | DISCHARGE

## 2023-11-14 RX ORDER — DESVENLAFAXINE 50 MG/1
1 TABLET, EXTENDED RELEASE ORAL
Qty: 0 | Refills: 0 | DISCHARGE

## 2023-11-14 RX ORDER — FAMOTIDINE 10 MG/ML
1 INJECTION INTRAVENOUS
Refills: 0 | DISCHARGE

## 2023-11-14 NOTE — H&P PST ADULT - HISTORY OF PRESENT ILLNESS
64 yo female presents to PST prior to scheduled SVT Ablation on 11/27/23      62 yo female presents to PST prior to scheduled SVT Ablation on 11/27/23 with Dr. Rico Alvarado. Pmhx includes HLD, SVT (on high dose beta blocker), no OC, DM, GERD, hypothyroidism, central serous retinopathy. Patient reports hx of SVT for several years; last episode approximately 2 year ago. Endorses palpitations and sob during episodes - does not endorse chest pain, dizziness, syncope.     Of note, patient was on ozempic (stopped 1 month ago), then metformin (did not tolerate, had diarrhea), was prescribed Jardiance but did not  because of cost - working on getting price reduced. I told her to stop the Jardiance 3 days prior to procedure (last dose on 11/22) if she does start it. Instructed to contact Dr. Alvarado's office if prescribed any new medications.

## 2023-11-14 NOTE — H&P PST ADULT - NSICDXPASTMEDICALHX_GEN_ALL_CORE_FT
PAST MEDICAL HISTORY:  Anxiety     Depression     Diabetes     Double vision     GERD (gastroesophageal reflux disease)     H/O insomnia     H/O tinnitus     History of central serous retinopathy     HLD (hyperlipidemia)     Hypothyroidism     SVT (supraventricular tachycardia)

## 2023-11-14 NOTE — H&P PST ADULT - ASSESSMENT
DASI score:  DASI activity:  Loose teeth or denture: denies DASI score: 6  DASI activity: goes for walks several times a week, 2 flights, no exercise routine  Loose teeth or denture: denies

## 2023-11-14 NOTE — H&P PST ADULT - NSICDXFAMILYHX_GEN_ALL_CORE_FT
FAMILY HISTORY:  Father  Still living? Unknown  Family history of heart attack, Age at diagnosis: Age Unknown  Family history of high cholesterol, Age at diagnosis: Age Unknown    Mother  Still living? Unknown  Family history of pacemaker, Age at diagnosis: Age Unknown    Sibling  Still living? Unknown  Family history of heart attack, Age at diagnosis: Age Unknown  FH: thyroid disease, Age at diagnosis: Age Unknown

## 2023-11-14 NOTE — H&P PST ADULT - PROBLEM SELECTOR PLAN 1
SVT ablation on 11/27/23 with Dr. Rico Alvarado.  Pre-op instructions given. Questions answered.  Hold metoprolol x 48 hrs as instructed by Dr. Alvarado's office.

## 2023-11-14 NOTE — H&P PST ADULT - NSICDXPASTSURGICALHX_GEN_ALL_CORE_FT
PAST SURGICAL HISTORY:  H/O carpal tunnel repair     H/O colonoscopy     H/O hemorrhoidectomy     History of appendectomy     History of D&C

## 2023-11-14 NOTE — H&P PST ADULT - NSANTHNECKRD_ENT_A_CORE
From: Laura [place]  To: Krunal Freed.  MD Naila  Sent: 3/5/2021 1:46 PM CST  Subject: Non-Urgent Medical Question    Hello,    I was wondering if it is okay to take a B12 supplement for hair, skin, and nail strength in addition to my prenatal? Cooper No

## 2023-11-21 RX ORDER — SEMAGLUTIDE 1.34 MG/ML
2 INJECTION, SOLUTION SUBCUTANEOUS
Refills: 0 | Status: DISCONTINUED | COMMUNITY
End: 2023-11-21

## 2023-11-27 ENCOUNTER — TRANSCRIPTION ENCOUNTER (OUTPATIENT)
Age: 63
End: 2023-11-27

## 2023-11-27 ENCOUNTER — OUTPATIENT (OUTPATIENT)
Dept: INPATIENT UNIT | Facility: HOSPITAL | Age: 63
LOS: 1 days | End: 2023-11-27
Payer: MEDICARE

## 2023-11-27 VITALS
RESPIRATION RATE: 16 BRPM | HEART RATE: 107 BPM | TEMPERATURE: 98 F | OXYGEN SATURATION: 95 % | SYSTOLIC BLOOD PRESSURE: 147 MMHG | DIASTOLIC BLOOD PRESSURE: 83 MMHG

## 2023-11-27 VITALS
OXYGEN SATURATION: 98 % | WEIGHT: 164.91 LBS | DIASTOLIC BLOOD PRESSURE: 87 MMHG | HEART RATE: 78 BPM | TEMPERATURE: 98 F | HEIGHT: 63 IN | RESPIRATION RATE: 16 BRPM | SYSTOLIC BLOOD PRESSURE: 149 MMHG

## 2023-11-27 DIAGNOSIS — Z98.890 OTHER SPECIFIED POSTPROCEDURAL STATES: Chronic | ICD-10-CM

## 2023-11-27 DIAGNOSIS — I47.11 INAPPROPRIATE SINUS TACHYCARDIA, SO STATED: ICD-10-CM

## 2023-11-27 DIAGNOSIS — Z90.49 ACQUIRED ABSENCE OF OTHER SPECIFIED PARTS OF DIGESTIVE TRACT: Chronic | ICD-10-CM

## 2023-11-27 LAB
GLUCOSE BLDC GLUCOMTR-MCNC: 105 MG/DL — HIGH (ref 70–99)
GLUCOSE BLDC GLUCOMTR-MCNC: 105 MG/DL — HIGH (ref 70–99)
GLUCOSE BLDC GLUCOMTR-MCNC: 145 MG/DL — HIGH (ref 70–99)
GLUCOSE BLDC GLUCOMTR-MCNC: 145 MG/DL — HIGH (ref 70–99)

## 2023-11-27 PROCEDURE — C1732: CPT

## 2023-11-27 PROCEDURE — 93653 COMPRE EP EVAL TX SVT: CPT

## 2023-11-27 PROCEDURE — 93623 PRGRMD STIMJ&PACG IV RX NFS: CPT

## 2023-11-27 PROCEDURE — 93005 ELECTROCARDIOGRAM TRACING: CPT

## 2023-11-27 PROCEDURE — C1894: CPT

## 2023-11-27 PROCEDURE — 93010 ELECTROCARDIOGRAM REPORT: CPT | Mod: 76

## 2023-11-27 PROCEDURE — C1893: CPT

## 2023-11-27 PROCEDURE — C1766: CPT

## 2023-11-27 PROCEDURE — 82962 GLUCOSE BLOOD TEST: CPT

## 2023-11-27 PROCEDURE — C1730: CPT

## 2023-11-27 RX ORDER — METOPROLOL TARTRATE 50 MG
1 TABLET ORAL
Qty: 30 | Refills: 2
Start: 2023-11-27 | End: 2024-02-24

## 2023-11-27 RX ORDER — DEXTROSE 50 % IN WATER 50 %
25 SYRINGE (ML) INTRAVENOUS ONCE
Refills: 0 | Status: DISCONTINUED | OUTPATIENT
Start: 2023-11-27 | End: 2023-11-27

## 2023-11-27 RX ORDER — GLUCAGON INJECTION, SOLUTION 0.5 MG/.1ML
1 INJECTION, SOLUTION SUBCUTANEOUS ONCE
Refills: 0 | Status: DISCONTINUED | OUTPATIENT
Start: 2023-11-27 | End: 2023-11-27

## 2023-11-27 RX ORDER — SODIUM CHLORIDE 9 MG/ML
1000 INJECTION, SOLUTION INTRAVENOUS
Refills: 0 | Status: DISCONTINUED | OUTPATIENT
Start: 2023-11-27 | End: 2023-11-27

## 2023-11-27 RX ORDER — METOPROLOL TARTRATE 50 MG
1.5 TABLET ORAL
Refills: 0 | DISCHARGE

## 2023-11-27 RX ORDER — DEXTROSE 50 % IN WATER 50 %
12.5 SYRINGE (ML) INTRAVENOUS ONCE
Refills: 0 | Status: DISCONTINUED | OUTPATIENT
Start: 2023-11-27 | End: 2023-11-27

## 2023-11-27 RX ORDER — DEXTROSE 50 % IN WATER 50 %
15 SYRINGE (ML) INTRAVENOUS ONCE
Refills: 0 | Status: DISCONTINUED | OUTPATIENT
Start: 2023-11-27 | End: 2023-11-27

## 2023-11-27 RX ORDER — SODIUM CHLORIDE 9 MG/ML
3 INJECTION INTRAMUSCULAR; INTRAVENOUS; SUBCUTANEOUS EVERY 8 HOURS
Refills: 0 | Status: DISCONTINUED | OUTPATIENT
Start: 2023-11-27 | End: 2023-11-27

## 2023-11-27 RX ORDER — INSULIN LISPRO 100/ML
VIAL (ML) SUBCUTANEOUS
Refills: 0 | Status: DISCONTINUED | OUTPATIENT
Start: 2023-11-27 | End: 2023-11-27

## 2023-11-27 RX ORDER — INSULIN LISPRO 100/ML
VIAL (ML) SUBCUTANEOUS AT BEDTIME
Refills: 0 | Status: DISCONTINUED | OUTPATIENT
Start: 2023-11-27 | End: 2023-11-27

## 2023-11-27 RX ADMIN — SODIUM CHLORIDE 3 MILLILITER(S): 9 INJECTION INTRAMUSCULAR; INTRAVENOUS; SUBCUTANEOUS at 14:07

## 2023-11-27 NOTE — ASU DISCHARGE PLAN (ADULT/PEDIATRIC) - CARE PROVIDER_API CALL
Rico Alvarado.  Cardiac Electrophysiology  [address] [place] [id number]  Phone: [phone]  Fax: [phone]  Established Patient  Scheduled Appointment: 01/09/2024 11:30 AM

## 2023-11-27 NOTE — PRE-ANESTHESIA EVALUATION ADULT - NSANTHAIRWAYFT_ENT_ALL_CORE
Mildly limited neck ROM d/t neck muscle spasm, no cervical spine issues or symptoms of neuropathy  Adequate mouth opening  TM distance ~ 3FB

## 2023-11-27 NOTE — ASU DISCHARGE PLAN (ADULT/PEDIATRIC) - PROVIDER TOKENS
PROVIDER:[TOKEN:[[id number]],SCHEDULEDAPPT:[01/09/2024],SCHEDULEDAPPTTIME:[11:30 AM],ESTABLISHEDPATIENT:[T]]

## 2023-11-27 NOTE — PRE-ANESTHESIA EVALUATION ADULT - NSANTHPMHFT_GEN_ALL_CORE
63F with PMH s/f  HLD, SVT (on high dose BB), NIDDM2, GERD, hypothyroidism, R central serous retinopathy (with double vision) who presents for SVT ablation.     Of note, patient was on ozempic (stopped 1 month ago), then metformin (did not tolerate, had diarrhea), was prescribed Jardiance but did not  because of cost - working on getting price reduced.

## 2023-11-27 NOTE — CHART NOTE - NSCHARTNOTEFT_GEN_A_CORE
Patient arrived today for SVT ablation  No complaints or recent illnesses reported    PMH and meds reviewed with patient   Labs reviewed     On exam  A+O x 4  Pulm CTA olvin  CV S1S2 RRR, no murmurs audible  Abd soft, non tender +BS  Groin sites benign +DP

## 2023-11-27 NOTE — ASU DISCHARGE PLAN (ADULT/PEDIATRIC) - ASU DC SPECIAL INSTRUCTIONSFT
See ablation instructions   Monitor right groin for bleeding   WOUND CARE:  The day AFTER your procedure  - Remove the bandage at the site GENTLY, clean with mild soap and water, and pat dry; leave open to air  - You may shower   - DO NOT apply lotions, creams, ointments, powder, parfumes to your incision site  -Check your groin every day. A small amount of bruising or soarness is normal, a bump ( smaller than nickel) might be present, normal  - DO NOT SOAK your site for 1 week ( no baths, no pools, no tubs, etc..)    ACTIVITY:  YOur procedure was done through your groin  for the next 5 DAYS:  - Limit climbing stairs, no strenous activity, pushing , pulling, or straining   DO NOT LIFT anything 10 lbs or heavier   you may resume sexual activity in 7 days, unless instructed otherwise  mild palpitations are normal     Follow heart healthy diet reccomended by your doctor, , if you smoke STOP SMOKING ( may call [phone] for center of tobacco control if you need assistance).  for the next 24 hours:   - stay at home and rest, do not drive or operate heavy [place]   do not drink alcoholic beverages   do not make important personal or business decisions     ***CALL YOUR DOCTOR ***  IF you have fever, chils, body aches, or severe pain, swelling, redness, heat, yellow drainage from your incision site  IF bleeding  or significant new swelling from your puncture site  IF you experience rapid heartbeat or palpitations that cause: lightheadness, dizziness, or fainting spell.  If you eperience difficulty swallowing, or pain with swallowing   IF unable to ge tin contact with yout doctor, you may call the Cardiology Office at [facility] at [phone] See ablation instructions   Monitor right groin for bleeding   WOUND CARE:  The day AFTER your procedure  - Remove the bandage at the site GENTLY, clean with mild soap and water, and pat dry; leave open to air  - You may shower   - DO NOT apply lotions, creams, ointments, powder, perfumes to your incision site  -Check your groin every day. A small amount of bruising or soreness is normal, a bump ( smaller than nickel) might be present, normal  - DO NOT SOAK your site for 1 week ( no baths, no pools, no tubs, etc..)    ACTIVITY:  YOur procedure was done through your groin  for the next 5 DAYS:  - Limit climbing stairs, no strenuous activity, pushing , pulling, or straining   DO NOT LIFT anything 10 lbs or heavier   you may resume sexual activity in 7 days, unless instructed otherwise  mild palpitations are normal     Follow heart healthy diet recommended by your doctor, , if you smoke STOP SMOKING ( may call [phone] for center of tobacco control if you need assistance).  for the next 24 hours:   - stay at home and rest, do not drive or operate heavy [place]   do not drink alcoholic beverages   do not make important personal or business decisions     ***CALL YOUR DOCTOR ***  IF you have fever, chills, body aches, or severe pain, swelling, redness, heat, yellow drainage from your incision site  IF bleeding  or significant new swelling from your puncture site  IF you experience rapid heartbeat or palpitations that cause: lightheadness, dizziness, or fainting spell.  If you experience difficulty swallowing, or pain with swallowing   IF unable to ge tin contact with your doctor, you may call the Cardiology Office at [facility] at [phone]

## 2023-12-12 ENCOUNTER — APPOINTMENT (OUTPATIENT)
Dept: CARDIOLOGY | Facility: CLINIC | Age: 63
End: 2023-12-12
Payer: MEDICARE

## 2023-12-12 VITALS
DIASTOLIC BLOOD PRESSURE: 83 MMHG | HEART RATE: 59 BPM | WEIGHT: 164 LBS | HEIGHT: 63 IN | OXYGEN SATURATION: 97 % | BODY MASS INDEX: 29.06 KG/M2 | SYSTOLIC BLOOD PRESSURE: 125 MMHG

## 2023-12-12 DIAGNOSIS — E78.5 HYPERLIPIDEMIA, UNSPECIFIED: ICD-10-CM

## 2023-12-12 DIAGNOSIS — R00.2 PALPITATIONS: ICD-10-CM

## 2023-12-12 PROBLEM — I47.10 SUPRAVENTRICULAR TACHYCARDIA, UNSPECIFIED: Chronic | Status: ACTIVE | Noted: 2023-11-14

## 2023-12-12 PROBLEM — H53.2 DIPLOPIA: Chronic | Status: ACTIVE | Noted: 2023-11-14

## 2023-12-12 PROBLEM — Z86.69 PERSONAL HISTORY OF OTHER DISEASES OF THE NERVOUS SYSTEM AND SENSE ORGANS: Chronic | Status: ACTIVE | Noted: 2023-11-14

## 2023-12-12 PROBLEM — E11.9 TYPE 2 DIABETES MELLITUS WITHOUT COMPLICATIONS: Chronic | Status: ACTIVE | Noted: 2023-11-14

## 2023-12-12 PROCEDURE — 93000 ELECTROCARDIOGRAM COMPLETE: CPT

## 2023-12-12 PROCEDURE — 99214 OFFICE O/P EST MOD 30 MIN: CPT

## 2024-01-07 NOTE — HISTORY OF PRESENT ILLNESS
[FreeTextEntry1] : Emely is returning for a follow-up.   No CP No LE edema No syncope   Planning on EPS and likely abl

## 2024-01-07 NOTE — DISCUSSION/SUMMARY
[FreeTextEntry1] : Emely is a 61 y/o woman HTN ,Hchol, paroxysmal  A-tach;    EP procedure pending No further changes to meds [EKG obtained to assist in diagnosis and management of assessed problem(s)] : EKG obtained to assist in diagnosis and management of assessed problem(s)

## 2024-01-07 NOTE — CARDIOLOGY SUMMARY
[de-identified] : 10/10/23 Sinus Rhythm  -Negative T-waves May be normal -consider acute process.  ABNORMAL

## 2024-01-07 NOTE — PHYSICAL EXAM
[Well Developed] : well developed [Well Nourished] : well nourished [No Acute Distress] : no acute distress [Normal Conjunctiva] : normal conjunctiva [Normal Venous Pressure] : normal venous pressure [No Carotid Bruit] : no carotid bruit [Normal S1, S2] : normal S1, S2 [No Murmur] : no murmur [No Gallop] : no gallop [No Rub] : no rub [Clear Lung Fields] : clear lung fields [Good Air Entry] : good air entry [No Respiratory Distress] : no respiratory distress  [Soft] : abdomen soft [Non Tender] : non-tender [No Masses/organomegaly] : no masses/organomegaly [Normal Bowel Sounds] : normal bowel sounds [Normal Gait] : normal gait [No Edema] : no edema [No Clubbing] : no clubbing [No Cyanosis] : no cyanosis [No Varicosities] : no varicosities [No Rash] : no rash [No Skin Lesions] : no skin lesions [Moves all extremities] : moves all extremities [No Focal Deficits] : no focal deficits [Normal Speech] : normal speech [Alert and Oriented] : alert and oriented [Normal memory] : normal memory

## 2024-01-09 ENCOUNTER — APPOINTMENT (OUTPATIENT)
Dept: ELECTROPHYSIOLOGY | Facility: CLINIC | Age: 64
End: 2024-01-09
Payer: MEDICARE

## 2024-01-09 ENCOUNTER — NON-APPOINTMENT (OUTPATIENT)
Age: 64
End: 2024-01-09

## 2024-01-09 VITALS
SYSTOLIC BLOOD PRESSURE: 134 MMHG | HEIGHT: 63 IN | WEIGHT: 164 LBS | RESPIRATION RATE: 14 BRPM | DIASTOLIC BLOOD PRESSURE: 87 MMHG | OXYGEN SATURATION: 95 % | HEART RATE: 65 BPM | BODY MASS INDEX: 29.06 KG/M2

## 2024-01-09 PROCEDURE — 93000 ELECTROCARDIOGRAM COMPLETE: CPT

## 2024-01-09 PROCEDURE — 99213 OFFICE O/P EST LOW 20 MIN: CPT

## 2024-01-09 RX ORDER — EMPAGLIFLOZIN 25 MG/1
25 TABLET, FILM COATED ORAL DAILY
Refills: 0 | Status: ACTIVE | COMMUNITY

## 2024-01-29 PROBLEM — R00.2 PALPITATIONS: Status: ACTIVE | Noted: 2017-05-18

## 2024-01-31 NOTE — DISCUSSION/SUMMARY
[EKG obtained to assist in diagnosis and management of assessed problem(s)] : EKG obtained to assist in diagnosis and management of assessed problem(s) [FreeTextEntry1] : Emely is a 63 y/o woman HTN ,Hchol,  s/p SVT abl doing well Interested in weaning beta blocker - to which I recc a few more months at current dose  Addendum 1/31/24 Pt planning on Foot surgery She is at acceptable cardiovascular risk to proceed with the planned procedure no further testing is needed

## 2024-01-31 NOTE — HISTORY OF PRESENT ILLNESS
[FreeTextEntry1] : Emely is returning for a follow-up. Feels well - s/p AVNRT abl No CP No LE edema No syncope

## 2024-03-05 NOTE — SBIRT NOTE ADULT - NSSBIRTUNABLESCROTHER_GEN_A_CORE
ETOH consumption si within normal limits.  Scored a 3
balance training/bed mobility training/gait training/strengthening/transfer training

## 2024-03-05 NOTE — REVIEW OF SYSTEMS
[Palpitations] : palpitations [Anxiety] : anxiety [Negative] : Psychiatric [Feeling Fatigued] : not feeling fatigued Obhx:   Gynhx: Denies cysts, fibroids, abnormal paps or STDs

## 2024-05-01 ENCOUNTER — APPOINTMENT (OUTPATIENT)
Dept: ELECTROPHYSIOLOGY | Facility: CLINIC | Age: 64
End: 2024-05-01
Payer: MEDICARE

## 2024-05-01 PROBLEM — Z87.898 PERSONAL HISTORY OF OTHER SPECIFIED CONDITIONS: Chronic | Status: ACTIVE | Noted: 2023-11-14

## 2024-05-01 PROBLEM — Z86.69 PERSONAL HISTORY OF OTHER DISEASES OF THE NERVOUS SYSTEM AND SENSE ORGANS: Chronic | Status: ACTIVE | Noted: 2023-11-14

## 2024-05-06 ENCOUNTER — NON-APPOINTMENT (OUTPATIENT)
Age: 64
End: 2024-05-06

## 2024-05-07 ENCOUNTER — APPOINTMENT (OUTPATIENT)
Dept: ORTHOPEDIC SURGERY | Facility: CLINIC | Age: 64
End: 2024-05-07

## 2024-05-10 LAB
ALBUMIN SERPL ELPH-MCNC: 5 G/DL
ALP BLD-CCNC: 76 U/L
ALT SERPL-CCNC: 59 U/L
ANION GAP SERPL CALC-SCNC: 12 MMOL/L
AST SERPL-CCNC: 35 U/L
BILIRUB SERPL-MCNC: 0.5 MG/DL
BUN SERPL-MCNC: 14 MG/DL
CALCIUM SERPL-MCNC: 11 MG/DL
CHLORIDE SERPL-SCNC: 102 MMOL/L
CHOLEST SERPL-MCNC: 144 MG/DL
CO2 SERPL-SCNC: 27 MMOL/L
CREAT SERPL-MCNC: 0.76 MG/DL
EGFR: 87 ML/MIN/1.73M2
ESTIMATED AVERAGE GLUCOSE: 114 MG/DL
GLUCOSE SERPL-MCNC: 113 MG/DL
HBA1C MFR BLD HPLC: 5.6 %
HDLC SERPL-MCNC: 43 MG/DL
LDLC SERPL CALC-MCNC: 78 MG/DL
NONHDLC SERPL-MCNC: 101 MG/DL
POTASSIUM SERPL-SCNC: 4.7 MMOL/L
PROT SERPL-MCNC: 7.3 G/DL
SODIUM SERPL-SCNC: 141 MMOL/L
TRIGL SERPL-MCNC: 130 MG/DL

## 2024-05-14 ENCOUNTER — APPOINTMENT (OUTPATIENT)
Dept: CARDIOLOGY | Facility: CLINIC | Age: 64
End: 2024-05-14

## 2024-05-14 ENCOUNTER — NON-APPOINTMENT (OUTPATIENT)
Age: 64
End: 2024-05-14

## 2024-05-14 VITALS
HEIGHT: 63 IN | DIASTOLIC BLOOD PRESSURE: 79 MMHG | BODY MASS INDEX: 29.06 KG/M2 | HEART RATE: 79 BPM | OXYGEN SATURATION: 96 % | SYSTOLIC BLOOD PRESSURE: 113 MMHG | WEIGHT: 164 LBS

## 2024-05-14 PROCEDURE — 99213 OFFICE O/P EST LOW 20 MIN: CPT

## 2024-05-14 PROCEDURE — 93000 ELECTROCARDIOGRAM COMPLETE: CPT

## 2024-05-15 RX ORDER — FENOFIBRATE 48 MG/1
48 TABLET ORAL DAILY
Qty: 90 | Refills: 3 | Status: ACTIVE | COMMUNITY
Start: 2021-06-02 | End: 1900-01-01

## 2024-05-17 PROCEDURE — 93248 EXT ECG>7D<15D REV&INTERPJ: CPT

## 2024-06-10 RX ORDER — METOPROLOL SUCCINATE 50 MG/1
50 TABLET, EXTENDED RELEASE ORAL DAILY
Qty: 45 | Refills: 1 | Status: ACTIVE | COMMUNITY
Start: 2017-06-23 | End: 1900-01-01

## 2024-09-17 ENCOUNTER — APPOINTMENT (OUTPATIENT)
Dept: UROLOGY | Facility: CLINIC | Age: 64
End: 2024-09-17
Payer: MEDICARE

## 2024-09-17 VITALS
RESPIRATION RATE: 16 BRPM | SYSTOLIC BLOOD PRESSURE: 119 MMHG | OXYGEN SATURATION: 94 % | BODY MASS INDEX: 29.06 KG/M2 | HEART RATE: 68 BPM | HEIGHT: 63 IN | WEIGHT: 164 LBS | DIASTOLIC BLOOD PRESSURE: 79 MMHG

## 2024-09-17 DIAGNOSIS — R31.29 OTHER MICROSCOPIC HEMATURIA: ICD-10-CM

## 2024-09-17 PROCEDURE — 99203 OFFICE O/P NEW LOW 30 MIN: CPT

## 2024-09-17 NOTE — REVIEW OF SYSTEMS
[see HPI] : see HPI [Negative] : Heme/Lymph [Told you have blood in urine on a urine test] : told blood was present in a urine test

## 2024-09-17 NOTE — ASSESSMENT
[FreeTextEntry1] : 64-year-old woman with microscopic hematuria.  We discussed that the differential diagnosis includes both benign and malignant conditions including renal stones, urinary tract infections, and cancer of the bladder or ureter or kidney. Cystoscopy was recommended to rule out pathology in the bladder. CT Urogram was recommended to evaluate for presence of nephroureteral stones or malignancies. Urinalysis and urine culture were recommended to check for urinary tract infection. Pt agrees and understands. -Will send UA and Ucx -Will obtain CT Urogram -Will obtain Cystoscopy.   Patient will RTO for Cystoscopy.

## 2024-09-17 NOTE — HISTORY OF PRESENT ILLNESS
[FreeTextEntry1] : 64-year-old woman seen 09/17/2024 with complaint of microscopic hematuria. She reports for several years she has had microscopic blood on UA. Most recent UA showed 7RBC/HPF. She denies Frequent UTIs, or History of Kidney Stones. No Gross hematuria, no dysuria, no frequency, no urgency, no hesitancy, no straining, and no incontinence.    No fevers, no chills, no nausea, no vomiting, no flank pain.

## 2024-09-17 NOTE — PHYSICAL EXAM
[Normal Appearance] : normal appearance [Well Groomed] : well groomed [General Appearance - In No Acute Distress] : no acute distress [] : no respiratory distress [Respiration, Rhythm And Depth] : normal respiratory rhythm and effort [Exaggerated Use Of Accessory Muscles For Inspiration] : no accessory muscle use [Abdomen Soft] : soft [Abdomen Tenderness] : non-tender [Costovertebral Angle Tenderness] : no ~M costovertebral angle tenderness [Urinary Bladder Findings] : the bladder was normal on palpation [Normal Station and Gait] : the gait and station were normal for the patient's age [No Focal Deficits] : no focal deficits [Oriented To Time, Place, And Person] : oriented to person, place, and time [Affect] : the affect was normal [Mood] : the mood was normal

## 2024-09-18 LAB
APPEARANCE: CLEAR
BACTERIA: NEGATIVE /HPF
BILIRUBIN URINE: NEGATIVE
BLOOD URINE: NEGATIVE
CAST: 0 /LPF
COLOR: YELLOW
EPITHELIAL CELLS: 0 /HPF
GLUCOSE QUALITATIVE U: >=1000 MG/DL
KETONES URINE: NEGATIVE MG/DL
LEUKOCYTE ESTERASE URINE: NEGATIVE
MICROSCOPIC-UA: NORMAL
NITRITE URINE: NEGATIVE
PH URINE: 7
PROTEIN URINE: NEGATIVE MG/DL
RED BLOOD CELLS URINE: 3 /HPF
SPECIFIC GRAVITY URINE: 1.02
UROBILINOGEN URINE: 0.2 MG/DL
WHITE BLOOD CELLS URINE: 0 /HPF

## 2024-09-19 LAB — BACTERIA UR CULT: NORMAL

## 2024-09-24 ENCOUNTER — OUTPATIENT (OUTPATIENT)
Dept: OUTPATIENT SERVICES | Facility: HOSPITAL | Age: 64
LOS: 1 days | End: 2024-09-24
Payer: MEDICARE

## 2024-09-24 ENCOUNTER — APPOINTMENT (OUTPATIENT)
Dept: CT IMAGING | Facility: CLINIC | Age: 64
End: 2024-09-24
Payer: MEDICARE

## 2024-09-24 DIAGNOSIS — Z98.890 OTHER SPECIFIED POSTPROCEDURAL STATES: Chronic | ICD-10-CM

## 2024-09-24 DIAGNOSIS — Z90.49 ACQUIRED ABSENCE OF OTHER SPECIFIED PARTS OF DIGESTIVE TRACT: Chronic | ICD-10-CM

## 2024-09-24 DIAGNOSIS — R31.29 OTHER MICROSCOPIC HEMATURIA: ICD-10-CM

## 2024-09-24 PROCEDURE — 74178 CT ABD&PLV WO CNTR FLWD CNTR: CPT

## 2024-09-24 PROCEDURE — 74178 CT ABD&PLV WO CNTR FLWD CNTR: CPT | Mod: 26,MH

## 2024-10-01 ENCOUNTER — APPOINTMENT (OUTPATIENT)
Dept: UROLOGY | Facility: CLINIC | Age: 64
End: 2024-10-01
Payer: MEDICARE

## 2024-10-01 VITALS
RESPIRATION RATE: 16 BRPM | WEIGHT: 156 LBS | SYSTOLIC BLOOD PRESSURE: 108 MMHG | OXYGEN SATURATION: 94 % | HEART RATE: 73 BPM | BODY MASS INDEX: 27.64 KG/M2 | HEIGHT: 63 IN | DIASTOLIC BLOOD PRESSURE: 74 MMHG

## 2024-10-01 DIAGNOSIS — R31.29 OTHER MICROSCOPIC HEMATURIA: ICD-10-CM

## 2024-10-01 PROCEDURE — 52000 CYSTOURETHROSCOPY: CPT

## 2024-10-08 NOTE — PATIENT PROFILE ADULT - INTERNATIONAL TRAVEL
Health Maintenance       Shingles Vaccine (2 of 3)  Overdue since 6/24/2014    COVID-19 Vaccine (2 - 2023-24 season)  Overdue since 9/1/2024    Influenza Vaccine (1)  Due since 9/1/2024           Following review of the above:  Patient wishes to discuss with clinician: Influenza  Patient is not proceeding with: COVID-19 and Shingles    Note: Refer to final orders and clinician documentation.       No

## 2024-11-11 ENCOUNTER — RX RENEWAL (OUTPATIENT)
Age: 64
End: 2024-11-11

## 2024-11-11 RX ORDER — ATORVASTATIN CALCIUM 40 MG/1
40 TABLET, FILM COATED ORAL
Qty: 90 | Refills: 2 | Status: ACTIVE | COMMUNITY
Start: 2024-11-11 | End: 1900-01-01

## 2024-11-11 RX ORDER — FENOFIBRATE 48 MG/1
48 TABLET ORAL
Qty: 90 | Refills: 2 | Status: ACTIVE | COMMUNITY
Start: 2024-11-11 | End: 1900-01-01

## 2024-12-23 ENCOUNTER — RX RENEWAL (OUTPATIENT)
Age: 64
End: 2024-12-23

## 2024-12-30 ENCOUNTER — APPOINTMENT (OUTPATIENT)
Dept: FAMILY MEDICINE | Facility: CLINIC | Age: 64
End: 2024-12-30
Payer: MEDICARE

## 2024-12-30 VITALS
BODY MASS INDEX: 27.46 KG/M2 | HEART RATE: 78 BPM | SYSTOLIC BLOOD PRESSURE: 110 MMHG | OXYGEN SATURATION: 98 % | TEMPERATURE: 97.1 F | WEIGHT: 155 LBS | DIASTOLIC BLOOD PRESSURE: 70 MMHG | HEIGHT: 63 IN

## 2024-12-30 DIAGNOSIS — J01.90 ACUTE SINUSITIS, UNSPECIFIED: ICD-10-CM

## 2024-12-30 PROCEDURE — 99203 OFFICE O/P NEW LOW 30 MIN: CPT

## 2024-12-30 RX ORDER — AZITHROMYCIN 250 MG/1
250 TABLET, FILM COATED ORAL
Qty: 1 | Refills: 0 | Status: ACTIVE | COMMUNITY
Start: 2024-12-30 | End: 1900-01-01

## 2024-12-30 RX ORDER — METHYLPREDNISOLONE 4 MG/1
4 TABLET ORAL
Qty: 1 | Refills: 0 | Status: ACTIVE | COMMUNITY
Start: 2024-12-30 | End: 1900-01-01

## 2025-01-27 ENCOUNTER — NON-APPOINTMENT (OUTPATIENT)
Age: 65
End: 2025-01-27

## 2025-03-04 ENCOUNTER — APPOINTMENT (OUTPATIENT)
Dept: UROLOGY | Facility: CLINIC | Age: 65
End: 2025-03-04
Payer: MEDICARE

## 2025-03-04 ENCOUNTER — NON-APPOINTMENT (OUTPATIENT)
Age: 65
End: 2025-03-04

## 2025-03-04 DIAGNOSIS — R31.29 OTHER MICROSCOPIC HEMATURIA: ICD-10-CM

## 2025-03-04 PROCEDURE — 99213 OFFICE O/P EST LOW 20 MIN: CPT

## 2025-03-05 LAB
APPEARANCE: CLEAR
BACTERIA: NEGATIVE /HPF
BILIRUBIN URINE: NEGATIVE
BLOOD URINE: ABNORMAL
CAST: 0 /LPF
COLOR: YELLOW
EPITHELIAL CELLS: 0 /HPF
GLUCOSE QUALITATIVE U: >=1000 MG/DL
KETONES URINE: NEGATIVE MG/DL
LEUKOCYTE ESTERASE URINE: NEGATIVE
MICROSCOPIC-UA: NORMAL
NITRITE URINE: NEGATIVE
PH URINE: 6
PROTEIN URINE: NEGATIVE MG/DL
RED BLOOD CELLS URINE: 1 /HPF
REVIEW: NORMAL
SPECIFIC GRAVITY URINE: >1.03
UROBILINOGEN URINE: 0.2 MG/DL
WHITE BLOOD CELLS URINE: 0 /HPF

## 2025-03-06 LAB — BACTERIA UR CULT: NORMAL

## 2025-03-13 ENCOUNTER — RX ONLY (RX ONLY)
Age: 65
End: 2025-03-13

## 2025-03-13 ENCOUNTER — OFFICE (OUTPATIENT)
Dept: URBAN - METROPOLITAN AREA CLINIC 12 | Facility: CLINIC | Age: 65
Setting detail: OPHTHALMOLOGY
End: 2025-03-13
Payer: MEDICARE

## 2025-03-13 DIAGNOSIS — H40.033: ICD-10-CM

## 2025-03-13 PROBLEM — H35.71: Status: ACTIVE | Noted: 2025-03-13

## 2025-03-13 PROBLEM — H43.39 FLOATERS: Status: ACTIVE | Noted: 2025-03-13

## 2025-03-13 PROBLEM — H11.44: Status: ACTIVE | Noted: 2025-03-13

## 2025-03-13 PROCEDURE — 76514 ECHO EXAM OF EYE THICKNESS: CPT | Performed by: STUDENT IN AN ORGANIZED HEALTH CARE EDUCATION/TRAINING PROGRAM

## 2025-03-13 PROCEDURE — 92083 EXTENDED VISUAL FIELD XM: CPT | Performed by: STUDENT IN AN ORGANIZED HEALTH CARE EDUCATION/TRAINING PROGRAM

## 2025-03-13 PROCEDURE — 92133 CPTRZD OPH DX IMG PST SGM ON: CPT | Performed by: STUDENT IN AN ORGANIZED HEALTH CARE EDUCATION/TRAINING PROGRAM

## 2025-03-13 PROCEDURE — 99203 OFFICE O/P NEW LOW 30 MIN: CPT | Performed by: STUDENT IN AN ORGANIZED HEALTH CARE EDUCATION/TRAINING PROGRAM

## 2025-03-13 PROCEDURE — 92020 GONIOSCOPY: CPT | Performed by: STUDENT IN AN ORGANIZED HEALTH CARE EDUCATION/TRAINING PROGRAM

## 2025-03-13 ASSESSMENT — CONFRONTATIONAL VISUAL FIELD TEST (CVF)
OD_FINDINGS: FULL
OS_FINDINGS: FULL

## 2025-03-13 ASSESSMENT — PACHYMETRY
OS_CT_UM: 528
OD_CT_UM: 523
OD_CT_CORRECTION: 1
OS_CT_CORRECTION: 1

## 2025-03-13 ASSESSMENT — TONOMETRY
OD_IOP_MMHG: 15
OS_IOP_MMHG: 14

## 2025-03-14 ASSESSMENT — REFRACTION_CURRENTRX
OS_VPRISM_DIRECTION: SV
OD_ADD: +1.75
OD_VPRISM_DIRECTION: SV
OS_ADD: +1.75
OD_OVR_VA: 20/
OS_OVR_VA: 20/

## 2025-03-14 ASSESSMENT — KERATOMETRY
METHOD_AUTO_MANUAL: AUTO
OS_K2POWER_DIOPTERS: 47.00
OD_K1POWER_DIOPTERS: 46.50
OD_AXISANGLE_DEGREES: 16
OS_K1POWER_DIOPTERS: 47.00
OS_AXISANGLE_DEGREES: 090
OD_K2POWER_DIOPTERS: 47.00

## 2025-03-14 ASSESSMENT — REFRACTION_MANIFEST
OS_SPHERE: +0.50
OS_VA1: 20/20
OD_CYLINDER: -0.75
OD_SPHERE: +0.50
OD_AXIS: 95
OS_AXIS: 110
OD_VA1: 20/20-3
OS_CYLINDER: -0.25

## 2025-03-14 ASSESSMENT — REFRACTION_AUTOREFRACTION
OS_AXIS: 093
OD_CYLINDER: -1.25
OS_CYLINDER: -0.25
OD_AXIS: 097
OD_SPHERE: +3.25
OS_SPHERE: +2.75

## 2025-03-14 ASSESSMENT — VISUAL ACUITY
OS_BCVA: 20/60
OD_BCVA: 20/30+2

## 2025-04-28 ENCOUNTER — APPOINTMENT (OUTPATIENT)
Dept: OPHTHALMOLOGY | Facility: CLINIC | Age: 65
End: 2025-04-28

## 2025-04-28 ENCOUNTER — RX RENEWAL (OUTPATIENT)
Age: 65
End: 2025-04-28

## 2025-05-19 ENCOUNTER — NON-APPOINTMENT (OUTPATIENT)
Age: 65
End: 2025-05-19

## 2025-06-16 ENCOUNTER — OFFICE (OUTPATIENT)
Dept: URBAN - METROPOLITAN AREA CLINIC 12 | Facility: CLINIC | Age: 65
Setting detail: OPHTHALMOLOGY
End: 2025-06-16
Payer: MEDICARE

## 2025-06-16 DIAGNOSIS — H40.033: ICD-10-CM

## 2025-06-16 PROBLEM — H11.442: Status: ACTIVE | Noted: 2025-06-16

## 2025-06-16 PROBLEM — H53.2 DIPLOPIA: Status: ACTIVE | Noted: 2025-06-16

## 2025-06-16 PROBLEM — H35.711: Status: ACTIVE | Noted: 2025-06-16

## 2025-06-16 PROBLEM — H43.393 VITREOUS FLOATERS; BOTH EYES: Status: ACTIVE | Noted: 2025-06-16

## 2025-06-16 PROCEDURE — 99213 OFFICE O/P EST LOW 20 MIN: CPT | Performed by: STUDENT IN AN ORGANIZED HEALTH CARE EDUCATION/TRAINING PROGRAM

## 2025-06-16 ASSESSMENT — REFRACTION_AUTOREFRACTION
OD_SPHERE: +2.50
OS_AXIS: 105
OS_SPHERE: +2.50
OD_CYLINDER: -1.00
OS_CYLINDER: -0.50
OD_AXIS: 087

## 2025-06-16 ASSESSMENT — REFRACTION_MANIFEST
OD_AXIS: 95
OD_CYLINDER: -0.75
OS_VA1: 20/20
OS_CYLINDER: -0.25
OD_SPHERE: +0.50
OD_VA1: 20/20-3
OS_SPHERE: +0.50
OS_AXIS: 110

## 2025-06-16 ASSESSMENT — PACHYMETRY
OS_CT_CORRECTION: 1
OD_CT_UM: 523
OS_CT_UM: 528
OD_CT_CORRECTION: 1

## 2025-06-16 ASSESSMENT — REFRACTION_CURRENTRX
OD_VPRISM_DIRECTION: SV
OD_OVR_VA: 20/
OD_ADD: +1.75
OS_ADD: +1.75
OS_VPRISM_DIRECTION: SV
OS_OVR_VA: 20/

## 2025-06-16 ASSESSMENT — KERATOMETRY
OS_AXISANGLE_DEGREES: 090
OD_AXISANGLE_DEGREES: 171
OD_K2POWER_DIOPTERS: 47.00
METHOD_AUTO_MANUAL: AUTO
OD_K1POWER_DIOPTERS: 46.75
OS_K2POWER_DIOPTERS: 47.00
OS_K1POWER_DIOPTERS: 47.00

## 2025-06-16 ASSESSMENT — TONOMETRY
OS_IOP_MMHG: 21
OD_IOP_MMHG: 21

## 2025-06-16 ASSESSMENT — CONFRONTATIONAL VISUAL FIELD TEST (CVF)
OD_FINDINGS: FULL
OS_FINDINGS: FULL

## 2025-06-16 ASSESSMENT — VISUAL ACUITY
OS_BCVA: 20/40-2
OD_BCVA: 20/30+2

## 2025-06-17 ENCOUNTER — NON-APPOINTMENT (OUTPATIENT)
Age: 65
End: 2025-06-17

## 2025-06-17 ENCOUNTER — APPOINTMENT (OUTPATIENT)
Dept: OPHTHALMOLOGY | Facility: CLINIC | Age: 65
End: 2025-06-17
Payer: MEDICARE

## 2025-06-17 PROCEDURE — 92060 SENSORIMOTOR EXAMINATION: CPT

## 2025-06-17 PROCEDURE — 92015 DETERMINE REFRACTIVE STATE: CPT

## 2025-06-17 PROCEDURE — 99204 OFFICE O/P NEW MOD 45 MIN: CPT

## 2025-06-21 ENCOUNTER — RX RENEWAL (OUTPATIENT)
Age: 65
End: 2025-06-21

## 2025-07-15 ENCOUNTER — APPOINTMENT (OUTPATIENT)
Dept: ORTHOPEDIC SURGERY | Facility: CLINIC | Age: 65
End: 2025-07-15
Payer: MEDICARE

## 2025-07-15 PROBLEM — M54.41 ACUTE BILATERAL LOW BACK PAIN WITH RIGHT-SIDED SCIATICA: Status: ACTIVE | Noted: 2025-07-15

## 2025-07-15 PROCEDURE — 99204 OFFICE O/P NEW MOD 45 MIN: CPT

## 2025-07-15 PROCEDURE — 72100 X-RAY EXAM L-S SPINE 2/3 VWS: CPT

## 2025-07-15 PROCEDURE — 73502 X-RAY EXAM HIP UNI 2-3 VIEWS: CPT

## 2025-07-15 RX ORDER — METHYLPREDNISOLONE 4 MG/1
4 TABLET ORAL
Qty: 1 | Refills: 0 | Status: ACTIVE | COMMUNITY
Start: 2025-07-15 | End: 1900-01-01

## 2025-07-16 RX ORDER — CYCLOBENZAPRINE HYDROCHLORIDE 5 MG/1
5 TABLET, FILM COATED ORAL 3 TIMES DAILY
Qty: 30 | Refills: 1 | Status: ACTIVE | COMMUNITY
Start: 2025-07-15 | End: 1900-01-01

## 2025-07-16 RX ORDER — METOPROLOL SUCCINATE 25 MG/1
25 TABLET, EXTENDED RELEASE ORAL
Qty: 90 | Refills: 0 | Status: ACTIVE | COMMUNITY
Start: 2025-07-16 | End: 1900-01-01

## 2025-08-04 ENCOUNTER — RX RENEWAL (OUTPATIENT)
Age: 65
End: 2025-08-04

## 2025-08-29 ENCOUNTER — APPOINTMENT (OUTPATIENT)
Dept: ORTHOPEDIC SURGERY | Facility: CLINIC | Age: 65
End: 2025-08-29
Payer: MEDICARE

## 2025-08-29 VITALS — HEIGHT: 63 IN | BODY MASS INDEX: 27.46 KG/M2 | WEIGHT: 155 LBS

## 2025-08-29 DIAGNOSIS — M54.41 LUMBAGO WITH SCIATICA, RIGHT SIDE: ICD-10-CM

## 2025-08-29 PROCEDURE — 99213 OFFICE O/P EST LOW 20 MIN: CPT

## 2025-09-03 ENCOUNTER — APPOINTMENT (OUTPATIENT)
Dept: MRI IMAGING | Facility: CLINIC | Age: 65
End: 2025-09-03
Payer: MEDICARE

## 2025-09-03 PROCEDURE — 72148 MRI LUMBAR SPINE W/O DYE: CPT

## 2025-09-04 ENCOUNTER — RX RENEWAL (OUTPATIENT)
Age: 65
End: 2025-09-04

## 2025-09-09 ENCOUNTER — APPOINTMENT (OUTPATIENT)
Dept: CARDIOLOGY | Facility: CLINIC | Age: 65
End: 2025-09-09
Payer: MEDICARE

## 2025-09-09 ENCOUNTER — TRANSCRIPTION ENCOUNTER (OUTPATIENT)
Age: 65
End: 2025-09-09

## 2025-09-09 ENCOUNTER — NON-APPOINTMENT (OUTPATIENT)
Age: 65
End: 2025-09-09

## 2025-09-09 VITALS
DIASTOLIC BLOOD PRESSURE: 73 MMHG | HEART RATE: 68 BPM | OXYGEN SATURATION: 94 % | SYSTOLIC BLOOD PRESSURE: 108 MMHG | BODY MASS INDEX: 27.46 KG/M2 | HEIGHT: 63 IN | WEIGHT: 155 LBS

## 2025-09-09 DIAGNOSIS — R00.2 PALPITATIONS: ICD-10-CM

## 2025-09-09 DIAGNOSIS — R94.31 ABNORMAL ELECTROCARDIOGRAM [ECG] [EKG]: ICD-10-CM

## 2025-09-09 DIAGNOSIS — E78.5 HYPERLIPIDEMIA, UNSPECIFIED: ICD-10-CM

## 2025-09-09 PROCEDURE — 99214 OFFICE O/P EST MOD 30 MIN: CPT

## 2025-09-09 PROCEDURE — 93000 ELECTROCARDIOGRAM COMPLETE: CPT

## 2025-09-10 ENCOUNTER — APPOINTMENT (OUTPATIENT)
Dept: ORTHOPEDIC SURGERY | Facility: CLINIC | Age: 65
End: 2025-09-10
Payer: MEDICARE

## 2025-09-10 DIAGNOSIS — M54.41 LUMBAGO WITH SCIATICA, RIGHT SIDE: ICD-10-CM

## 2025-09-10 PROCEDURE — 99213 OFFICE O/P EST LOW 20 MIN: CPT

## 2025-09-17 ENCOUNTER — APPOINTMENT (OUTPATIENT)
Dept: PAIN MANAGEMENT | Facility: CLINIC | Age: 65
End: 2025-09-17
Payer: MEDICARE

## 2025-09-17 VITALS — HEIGHT: 63 IN | BODY MASS INDEX: 27.46 KG/M2 | WEIGHT: 155 LBS

## 2025-09-17 PROBLEM — M54.16 LUMBAR RADICULOPATHY, ACUTE: Status: ACTIVE | Noted: 2025-09-17

## 2025-09-17 PROCEDURE — 99204 OFFICE O/P NEW MOD 45 MIN: CPT

## 2025-09-17 PROCEDURE — G2211 COMPLEX E/M VISIT ADD ON: CPT
